# Patient Record
Sex: FEMALE | Race: WHITE | NOT HISPANIC OR LATINO | Employment: OTHER | URBAN - METROPOLITAN AREA
[De-identification: names, ages, dates, MRNs, and addresses within clinical notes are randomized per-mention and may not be internally consistent; named-entity substitution may affect disease eponyms.]

---

## 2023-11-03 ENCOUNTER — LAB (OUTPATIENT)
Dept: LAB | Facility: CLINIC | Age: 73
End: 2023-11-03
Payer: MEDICARE

## 2023-11-03 ENCOUNTER — HOSPITAL ENCOUNTER (OUTPATIENT)
Dept: RADIOLOGY | Facility: HOSPITAL | Age: 73
Discharge: HOME | End: 2023-11-03
Payer: MEDICARE

## 2023-11-03 DIAGNOSIS — D50.9 IRON DEFICIENCY ANEMIA, UNSPECIFIED: ICD-10-CM

## 2023-11-03 DIAGNOSIS — C50.512 MALIGNANT NEOPLASM OF LOWER-OUTER QUADRANT OF LEFT FEMALE BREAST (MULTI): ICD-10-CM

## 2023-11-03 DIAGNOSIS — T45.4X5A ADVERSE EFFECT OF IRON AND ITS COMPOUNDS, INITIAL ENCOUNTER: ICD-10-CM

## 2023-11-03 DIAGNOSIS — C34.31 MALIGNANT NEOPLASM OF LOWER LOBE, RIGHT BRONCHUS OR LUNG (MULTI): ICD-10-CM

## 2023-11-03 LAB
ALBUMIN SERPL BCP-MCNC: 4.4 G/DL (ref 3.4–5)
ALP SERPL-CCNC: 47 U/L (ref 33–136)
ALT SERPL W P-5'-P-CCNC: 17 U/L (ref 7–45)
ANION GAP SERPL CALC-SCNC: 14 MMOL/L (ref 10–20)
AST SERPL W P-5'-P-CCNC: 26 U/L (ref 9–39)
BASOPHILS # BLD AUTO: 0.03 X10*3/UL (ref 0–0.1)
BASOPHILS NFR BLD AUTO: 0.6 %
BILIRUB SERPL-MCNC: 0.4 MG/DL (ref 0–1.2)
BUN SERPL-MCNC: 15 MG/DL (ref 6–23)
CALCIUM SERPL-MCNC: 8.3 MG/DL (ref 8.6–10.3)
CHLORIDE SERPL-SCNC: 103 MMOL/L (ref 98–107)
CO2 SERPL-SCNC: 26 MMOL/L (ref 21–32)
CREAT SERPL-MCNC: 1.06 MG/DL (ref 0.5–1.05)
EOSINOPHIL # BLD AUTO: 0.1 X10*3/UL (ref 0–0.4)
EOSINOPHIL NFR BLD AUTO: 2 %
ERYTHROCYTE [DISTWIDTH] IN BLOOD BY AUTOMATED COUNT: 15.7 % (ref 11.5–14.5)
FERRITIN SERPL-MCNC: 86 NG/ML (ref 8–150)
GFR SERPL CREATININE-BSD FRML MDRD: 55 ML/MIN/1.73M*2
GLUCOSE SERPL-MCNC: 109 MG/DL (ref 74–99)
HCT VFR BLD AUTO: 33.5 % (ref 36–46)
HGB BLD-MCNC: 10.8 G/DL (ref 12–16)
IMM GRANULOCYTES # BLD AUTO: 0.04 X10*3/UL (ref 0–0.5)
IMM GRANULOCYTES NFR BLD AUTO: 0.8 % (ref 0–0.9)
IRON SATN MFR SERPL: 35 % (ref 25–45)
IRON SERPL-MCNC: 107 UG/DL (ref 35–150)
LYMPHOCYTES # BLD AUTO: 1.67 X10*3/UL (ref 0.8–3)
LYMPHOCYTES NFR BLD AUTO: 33.3 %
MCH RBC QN AUTO: 31.8 PG (ref 26–34)
MCHC RBC AUTO-ENTMCNC: 32.2 G/DL (ref 32–36)
MCV RBC AUTO: 99 FL (ref 80–100)
MONOCYTES # BLD AUTO: 0.5 X10*3/UL (ref 0.05–0.8)
MONOCYTES NFR BLD AUTO: 10 %
NEUTROPHILS # BLD AUTO: 2.68 X10*3/UL (ref 1.6–5.5)
NEUTROPHILS NFR BLD AUTO: 53.3 %
PLATELET # BLD AUTO: 212 X10*3/UL (ref 150–450)
POTASSIUM SERPL-SCNC: 3.9 MMOL/L (ref 3.5–5.3)
PROT SERPL-MCNC: 6.7 G/DL (ref 6.4–8.2)
RBC # BLD AUTO: 3.4 X10*6/UL (ref 4–5.2)
SODIUM SERPL-SCNC: 139 MMOL/L (ref 136–145)
TIBC SERPL-MCNC: 309 UG/DL (ref 240–445)
UIBC SERPL-MCNC: 202 UG/DL (ref 110–370)
WBC # BLD AUTO: 5 X10*3/UL (ref 4.4–11.3)

## 2023-11-03 PROCEDURE — 70470 CT HEAD/BRAIN W/O & W/DYE: CPT

## 2023-11-03 PROCEDURE — 85025 COMPLETE CBC W/AUTO DIFF WBC: CPT

## 2023-11-03 PROCEDURE — 82728 ASSAY OF FERRITIN: CPT

## 2023-11-03 PROCEDURE — 2550000001 HC RX 255 CONTRASTS: Performed by: INTERNAL MEDICINE

## 2023-11-03 PROCEDURE — 83540 ASSAY OF IRON: CPT

## 2023-11-03 PROCEDURE — 36415 COLL VENOUS BLD VENIPUNCTURE: CPT

## 2023-11-03 PROCEDURE — 70470 CT HEAD/BRAIN W/O & W/DYE: CPT | Performed by: RADIOLOGY

## 2023-11-03 PROCEDURE — 80053 COMPREHEN METABOLIC PANEL: CPT

## 2023-11-03 PROCEDURE — 83550 IRON BINDING TEST: CPT

## 2023-11-03 RX ADMIN — IOHEXOL 50 ML: 350 INJECTION, SOLUTION INTRAVENOUS at 09:03

## 2024-01-08 PROBLEM — M96.1 FAILED BACK SYNDROME OF CERVICAL SPINE: Status: ACTIVE | Noted: 2024-01-08

## 2024-01-08 PROBLEM — C34.90 LUNG CANCER (MULTI): Status: ACTIVE | Noted: 2024-01-08

## 2024-01-08 PROBLEM — I49.3 PREMATURE VENTRICULAR CONTRACTIONS: Status: ACTIVE | Noted: 2024-01-08

## 2024-01-08 PROBLEM — E66.3 OVERWEIGHT: Status: ACTIVE | Noted: 2024-01-08

## 2024-01-08 PROBLEM — L53.9 ERYTHEMA OF BREAST: Status: ACTIVE | Noted: 2024-01-08

## 2024-01-08 PROBLEM — M25.531 RIGHT WRIST PAIN: Status: ACTIVE | Noted: 2024-01-08

## 2024-01-08 PROBLEM — F17.200 NICOTINE DEPENDENCE: Status: ACTIVE | Noted: 2024-01-08

## 2024-01-08 PROBLEM — M50.20 OTHER CERVICAL DISC DISPLACEMENT, UNSPECIFIED CERVICAL REGION: Status: ACTIVE | Noted: 2024-01-08

## 2024-01-08 PROBLEM — M54.2 CERVICALGIA: Status: ACTIVE | Noted: 2024-01-08

## 2024-01-08 PROBLEM — R00.0 HEART RATE FAST: Status: ACTIVE | Noted: 2024-01-08

## 2024-01-08 PROBLEM — R91.1 LUNG NODULE: Status: ACTIVE | Noted: 2024-01-08

## 2024-01-08 PROBLEM — M47.812 SPONDYLOSIS OF CERVICAL REGION WITHOUT MYELOPATHY OR RADICULOPATHY: Status: ACTIVE | Noted: 2024-01-08

## 2024-01-08 PROBLEM — S63.501A SPRAIN OF RIGHT WRIST: Status: ACTIVE | Noted: 2024-01-08

## 2024-01-08 PROBLEM — E87.5 HYPERKALEMIA: Status: ACTIVE | Noted: 2024-01-08

## 2024-01-08 PROBLEM — S69.81XA INJURY OF TRIANGULAR FIBROCARTILAGE COMPLEX OF RIGHT WRIST: Status: ACTIVE | Noted: 2024-01-08

## 2024-01-08 PROBLEM — R06.02 SHORTNESS OF BREATH: Status: ACTIVE | Noted: 2024-01-08

## 2024-01-08 PROBLEM — C50.512 MALIGNANT NEOPLASM OF LOWER-OUTER QUADRANT OF LEFT FEMALE BREAST (MULTI): Status: ACTIVE | Noted: 2024-01-08

## 2024-01-08 PROBLEM — Z91.199 NONCOMPLIANCE WITH THERAPEUTIC PLAN: Status: ACTIVE | Noted: 2024-01-08

## 2024-01-08 RX ORDER — IPRATROPIUM BROMIDE AND ALBUTEROL 20; 100 UG/1; UG/1
2 SPRAY, METERED RESPIRATORY (INHALATION) EVERY 6 HOURS
COMMUNITY
Start: 2021-09-18

## 2024-01-08 RX ORDER — NITROGLYCERIN 0.4 MG/1
0.4 TABLET SUBLINGUAL EVERY 5 MIN PRN
COMMUNITY
Start: 2021-09-30

## 2024-01-08 RX ORDER — FLUTICASONE FUROATE, UMECLIDINIUM BROMIDE AND VILANTEROL TRIFENATATE 100; 62.5; 25 UG/1; UG/1; UG/1
1 POWDER RESPIRATORY (INHALATION) DAILY
COMMUNITY

## 2024-01-08 RX ORDER — AMLODIPINE BESYLATE 5 MG/1
5 TABLET ORAL DAILY
COMMUNITY
Start: 2011-03-10

## 2024-01-08 RX ORDER — OMEPRAZOLE 20 MG/1
20 CAPSULE, DELAYED RELEASE ORAL
COMMUNITY
Start: 2023-08-25

## 2024-01-08 RX ORDER — PRAVASTATIN SODIUM 80 MG/1
80 TABLET ORAL DAILY
COMMUNITY

## 2024-01-08 RX ORDER — CALCIUM CARBONATE 600 MG
1 TABLET ORAL 2 TIMES DAILY
COMMUNITY

## 2024-01-08 RX ORDER — ASPIRIN 81 MG/1
1 TABLET ORAL DAILY
COMMUNITY
End: 2024-01-29 | Stop reason: WASHOUT

## 2024-01-08 RX ORDER — HYDROCODONE BITARTRATE AND ACETAMINOPHEN 5; 325 MG/1; MG/1
1 TABLET ORAL EVERY 6 HOURS PRN
COMMUNITY

## 2024-01-08 RX ORDER — BACLOFEN 20 MG
1 TABLET ORAL 2 TIMES DAILY
COMMUNITY
End: 2024-01-29 | Stop reason: WASHOUT

## 2024-01-08 RX ORDER — GLIPIZIDE 2.5 MG/1
1 TABLET, EXTENDED RELEASE ORAL DAILY
COMMUNITY

## 2024-01-08 RX ORDER — METOPROLOL SUCCINATE 50 MG/1
2 TABLET, EXTENDED RELEASE ORAL 2 TIMES DAILY
COMMUNITY
End: 2024-01-29 | Stop reason: WASHOUT

## 2024-01-08 RX ORDER — METFORMIN HYDROCHLORIDE 500 MG/1
500 TABLET ORAL
COMMUNITY
Start: 2012-09-27

## 2024-01-08 RX ORDER — LOSARTAN POTASSIUM 100 MG/1
100 TABLET ORAL DAILY
COMMUNITY

## 2024-01-08 RX ORDER — CHOLECALCIFEROL (VITAMIN D3) 25 MCG
1000 TABLET ORAL DAILY
COMMUNITY

## 2024-01-08 RX ORDER — CYCLOBENZAPRINE HCL 10 MG
10 TABLET ORAL 3 TIMES DAILY PRN
COMMUNITY
Start: 2023-10-11

## 2024-01-08 RX ORDER — HYDROCHLOROTHIAZIDE 25 MG/1
25 TABLET ORAL
COMMUNITY

## 2024-01-08 RX ORDER — SPIRONOLACTONE 25 MG/1
25 TABLET ORAL DAILY
COMMUNITY

## 2024-01-08 RX ORDER — IPRATROPIUM BROMIDE 0.5 MG/2.5ML
0.5 SOLUTION RESPIRATORY (INHALATION) 4 TIMES DAILY
COMMUNITY
Start: 2023-07-12

## 2024-01-08 RX ORDER — ISOSORBIDE MONONITRATE 30 MG/1
1 TABLET, EXTENDED RELEASE ORAL DAILY
COMMUNITY

## 2024-01-29 ENCOUNTER — OFFICE VISIT (OUTPATIENT)
Dept: CARDIOLOGY | Facility: CLINIC | Age: 74
End: 2024-01-29
Payer: MEDICARE

## 2024-01-29 VITALS
HEART RATE: 74 BPM | TEMPERATURE: 97.5 F | SYSTOLIC BLOOD PRESSURE: 124 MMHG | HEIGHT: 61 IN | WEIGHT: 171 LBS | DIASTOLIC BLOOD PRESSURE: 75 MMHG | BODY MASS INDEX: 32.28 KG/M2

## 2024-01-29 DIAGNOSIS — E66.9 OBESITY (BMI 30.0-34.9): ICD-10-CM

## 2024-01-29 DIAGNOSIS — I25.10 ARTERIOSCLEROSIS OF CORONARY ARTERY: ICD-10-CM

## 2024-01-29 DIAGNOSIS — I10 HYPERTENSION, UNSPECIFIED TYPE: ICD-10-CM

## 2024-01-29 DIAGNOSIS — E78.5 HYPERLIPIDEMIA, UNSPECIFIED HYPERLIPIDEMIA TYPE: ICD-10-CM

## 2024-01-29 PROBLEM — E66.811 OBESITY (BMI 30.0-34.9): Status: ACTIVE | Noted: 2024-01-29

## 2024-01-29 PROCEDURE — 99214 OFFICE O/P EST MOD 30 MIN: CPT | Performed by: INTERNAL MEDICINE

## 2024-01-29 PROCEDURE — 1159F MED LIST DOCD IN RCRD: CPT | Performed by: INTERNAL MEDICINE

## 2024-01-29 PROCEDURE — 1036F TOBACCO NON-USER: CPT | Performed by: INTERNAL MEDICINE

## 2024-01-29 PROCEDURE — 3078F DIAST BP <80 MM HG: CPT | Performed by: INTERNAL MEDICINE

## 2024-01-29 PROCEDURE — 3074F SYST BP LT 130 MM HG: CPT | Performed by: INTERNAL MEDICINE

## 2024-01-29 RX ORDER — METOPROLOL TARTRATE 50 MG/1
50 TABLET ORAL 2 TIMES DAILY
COMMUNITY

## 2024-01-29 ASSESSMENT — PATIENT HEALTH QUESTIONNAIRE - PHQ9
1. LITTLE INTEREST OR PLEASURE IN DOING THINGS: NOT AT ALL
2. FEELING DOWN, DEPRESSED OR HOPELESS: NOT AT ALL
SUM OF ALL RESPONSES TO PHQ9 QUESTIONS 1 AND 2: 0

## 2024-01-29 ASSESSMENT — ENCOUNTER SYMPTOMS
CONSTITUTIONAL NEGATIVE: 1
NEUROLOGICAL NEGATIVE: 1
RESPIRATORY NEGATIVE: 1
CARDIOVASCULAR NEGATIVE: 1

## 2024-01-29 NOTE — PROGRESS NOTES
Referred by Dr. Asif ref. provider found provider found for   Chief Complaint   Patient presents with    Follow-up     Follow up.         History of Present Illness  Lisa Andrade is a 73 y.o. year old female patient with history of coronary artery disease.  Doing well from a cardiac standpoint no complaint no symptoms of chest pain or shortness of breath.  Denies any symptoms of palpitations syncope or presyncope.  I discussed with the patient in length we will continue medication schedule for blood work apparently by primary care physician results are pending.  Will call for any problem and follow-up as scheduled    Past Medical History  Past Medical History:   Diagnosis Date    Old myocardial infarction     History of myocardial infarction    Personal history of diseases of the blood and blood-forming organs and certain disorders involving the immune mechanism     History of anemia    Personal history of malignant neoplasm of breast     History of malignant neoplasm of female breast    Personal history of other diseases of the circulatory system     History of hypertension    Personal history of other diseases of the circulatory system     History of cardiac murmur    Personal history of other diseases of the digestive system     History of esophageal reflux    Personal history of other diseases of the digestive system     History of hiatal hernia    Personal history of other diseases of the musculoskeletal system and connective tissue     History of backache    Personal history of other diseases of the musculoskeletal system and connective tissue     History of rheumatoid arthritis    Personal history of other endocrine, nutritional and metabolic disease     History of diabetes mellitus    Personal history of other endocrine, nutritional and metabolic disease     History of hypokalemia    Pure hypercholesterolemia, unspecified     High cholesterol       Social History  Social History     Tobacco Use    Smoking  status: Former     Types: Cigarettes    Smokeless tobacco: Never   Substance Use Topics    Alcohol use: Not Currently    Drug use: Not on file       Family History     Family History   Problem Relation Name Age of Onset    Hypertension Mother      Coronary artery disease Mother      Migraines Mother      No Known Problems Father      Hodgkin's lymphoma Sister      Uterine cancer Sister         Review of Systems  As per HPI, all other systems reviewed and negative.    Allergies:  Allergies   Allergen Reactions    Fish Oil Unknown    Lipitor [Atorvastatin] Unknown    Wasp Venom Unknown    Zocor [Simvastatin] Unknown        Outpatient Medications:  Current Outpatient Medications   Medication Instructions    amLODIPine (NORVASC) 5 mg, oral, Daily    aspirin 81 mg EC tablet 1 tablet, oral, Daily    calcium carbonate 600 mg calcium (1,500 mg) tablet 1 tablet, oral, 2 times daily    cholecalciferol (VITAMIN D-3) 1,000 Units, oral, Daily    cyclobenzaprine (FLEXERIL) 10 mg, oral, 3 times daily PRN    fluticasone-umeclidin-vilanter (Trelegy Ellipta) 100-62.5-25 mcg blister with device 1 puff, inhalation, Daily    glipiZIDE XL (Glucotrol XL) 2.5 mg 24 hr tablet 1 tablet, oral, Daily    hydroCHLOROthiazide (HYDRODIURIL) 25 mg, oral, Daily RT    HYDROcodone-acetaminophen (Norco) 5-325 mg tablet 1 tablet, oral, Every 6 hours PRN    ipratropium (ATROVENT) 0.5 mg, inhalation, 4 times daily    ipratropium-albuteroL (Combivent Respimat)  mcg/actuation inhaler 2 puffs, inhalation, Every 6 hours    isosorbide mononitrate ER (Imdur) 30 mg 24 hr tablet 1 tablet, oral, Daily    losartan (COZAAR) 100 mg, oral, Daily    magnesium oxide 500 mg tablet 1 tablet, oral, 2 times daily    metFORMIN (GLUCOPHAGE) 500 mg, oral, 2 times daily with meals    metoprolol succinate XL (Toprol-XL) 50 mg 24 hr tablet 2 tablets, oral, 2 times daily    metoprolol tartrate (LOPRESSOR) 50 mg, oral, 2 times daily    nitroglycerin (NITROSTAT) 0.4 mg,  sublingual, Every 5 min PRN    omeprazole (PRILOSEC) 20 mg, oral, Daily before breakfast    pravastatin (PRAVACHOL) 80 mg, oral, Daily, Alternate 40 mg daily and 80 mg daily    spironolactone (ALDACTONE) 25 mg, oral, Daily         Vitals:  Vitals:    01/29/24 1438   BP: 124/75   Pulse: 74   Temp: 36.4 °C (97.5 °F)       Physical Exam:  Physical Exam  Constitutional:       Appearance: She is obese.   Neck:      Vascular: No carotid bruit.   Cardiovascular:      Rate and Rhythm: Normal rate and regular rhythm.      Pulses: Normal pulses.      Heart sounds: No murmur heard.  Pulmonary:      Effort: Pulmonary effort is normal.      Breath sounds: Normal breath sounds.   Skin:     General: Skin is warm and dry.   Neurological:      General: No focal deficit present.      Mental Status: She is alert and oriented to person, place, and time.        Review of Systems   Constitutional: Negative.    Respiratory: Negative.     Cardiovascular: Negative.    Neurological: Negative.          Assessment/Plan   Problem List Items Addressed This Visit             ICD-10-CM    Hyperlipidemia E78.5    Hypertension I10    Arteriosclerosis of coronary artery I25.10    Relevant Medications    metoprolol tartrate (Lopressor) 50 mg tablet    Obesity (BMI 30.0-34.9) E66.9       Scribe Attestation  By signing my name below, Kathy LOPEZ LPN  , Scribe   attest that this documentation has been prepared under the direction and in the presence of Charles Bellamy MD.     Charles Bellamy MD [facility]  Interventional Cardiology   of [facility]     Thank you for allowing me to participate in the care of this patient. Please do not hesitate to contact me with any further questions or concerns.

## 2024-02-19 ENCOUNTER — LAB (OUTPATIENT)
Dept: LAB | Facility: CLINIC | Age: 74
End: 2024-02-19
Payer: MEDICARE

## 2024-02-19 DIAGNOSIS — C34.90 MALIGNANT NEOPLASM OF LUNG, UNSPECIFIED LATERALITY, UNSPECIFIED PART OF LUNG (MULTI): ICD-10-CM

## 2024-02-19 DIAGNOSIS — C50.512 MALIGNANT NEOPLASM OF LOWER-OUTER QUADRANT OF LEFT FEMALE BREAST, UNSPECIFIED ESTROGEN RECEPTOR STATUS (MULTI): ICD-10-CM

## 2024-02-19 LAB
ALBUMIN SERPL BCP-MCNC: 4.7 G/DL (ref 3.4–5)
ALP SERPL-CCNC: 57 U/L (ref 33–136)
ALT SERPL W P-5'-P-CCNC: 19 U/L (ref 7–45)
ANION GAP SERPL CALC-SCNC: 16 MMOL/L (ref 10–20)
AST SERPL W P-5'-P-CCNC: 28 U/L (ref 9–39)
BASOPHILS # BLD AUTO: 0.03 X10*3/UL (ref 0–0.1)
BASOPHILS NFR BLD AUTO: 0.6 %
BILIRUB SERPL-MCNC: 0.4 MG/DL (ref 0–1.2)
BUN SERPL-MCNC: 21 MG/DL (ref 6–23)
CALCIUM SERPL-MCNC: 9.7 MG/DL (ref 8.6–10.3)
CANCER AG27-29 SERPL-ACNC: 34.7 U/ML (ref 0–38.6)
CEA SERPL-MCNC: 1.6 UG/L
CHLORIDE SERPL-SCNC: 100 MMOL/L (ref 98–107)
CO2 SERPL-SCNC: 26 MMOL/L (ref 21–32)
CREAT SERPL-MCNC: 1.09 MG/DL (ref 0.5–1.05)
EGFRCR SERPLBLD CKD-EPI 2021: 53 ML/MIN/1.73M*2
EOSINOPHIL # BLD AUTO: 0.11 X10*3/UL (ref 0–0.4)
EOSINOPHIL NFR BLD AUTO: 2.2 %
ERYTHROCYTE [DISTWIDTH] IN BLOOD BY AUTOMATED COUNT: 13.1 % (ref 11.5–14.5)
FERRITIN SERPL-MCNC: 47 NG/ML (ref 8–150)
GLUCOSE SERPL-MCNC: 99 MG/DL (ref 74–99)
HCT VFR BLD AUTO: 35.6 % (ref 36–46)
HGB BLD-MCNC: 11.2 G/DL (ref 12–16)
IMM GRANULOCYTES # BLD AUTO: 0.02 X10*3/UL (ref 0–0.5)
IMM GRANULOCYTES NFR BLD AUTO: 0.4 % (ref 0–0.9)
IRON SATN MFR SERPL: 36 % (ref 25–45)
IRON SERPL-MCNC: 141 UG/DL (ref 35–150)
LYMPHOCYTES # BLD AUTO: 1.6 X10*3/UL (ref 0.8–3)
LYMPHOCYTES NFR BLD AUTO: 31.4 %
MCH RBC QN AUTO: 32.3 PG (ref 26–34)
MCHC RBC AUTO-ENTMCNC: 31.5 G/DL (ref 32–36)
MCV RBC AUTO: 103 FL (ref 80–100)
MONOCYTES # BLD AUTO: 0.53 X10*3/UL (ref 0.05–0.8)
MONOCYTES NFR BLD AUTO: 10.4 %
NEUTROPHILS # BLD AUTO: 2.8 X10*3/UL (ref 1.6–5.5)
NEUTROPHILS NFR BLD AUTO: 55 %
PLATELET # BLD AUTO: 236 X10*3/UL (ref 150–450)
POTASSIUM SERPL-SCNC: 5.1 MMOL/L (ref 3.5–5.3)
PROT SERPL-MCNC: 7.3 G/DL (ref 6.4–8.2)
RBC # BLD AUTO: 3.47 X10*6/UL (ref 4–5.2)
SODIUM SERPL-SCNC: 137 MMOL/L (ref 136–145)
TIBC SERPL-MCNC: 393 UG/DL (ref 240–445)
UIBC SERPL-MCNC: 252 UG/DL (ref 110–370)
WBC # BLD AUTO: 5.1 X10*3/UL (ref 4.4–11.3)

## 2024-02-19 PROCEDURE — 83540 ASSAY OF IRON: CPT

## 2024-02-19 PROCEDURE — 82378 CARCINOEMBRYONIC ANTIGEN: CPT

## 2024-02-19 PROCEDURE — 36415 COLL VENOUS BLD VENIPUNCTURE: CPT

## 2024-02-19 PROCEDURE — 84075 ASSAY ALKALINE PHOSPHATASE: CPT

## 2024-02-19 PROCEDURE — 86300 IMMUNOASSAY TUMOR CA 15-3: CPT

## 2024-02-19 PROCEDURE — 82728 ASSAY OF FERRITIN: CPT

## 2024-02-19 PROCEDURE — 85025 COMPLETE CBC W/AUTO DIFF WBC: CPT

## 2024-02-20 ENCOUNTER — HOSPITAL ENCOUNTER (OUTPATIENT)
Dept: RADIOLOGY | Facility: HOSPITAL | Age: 74
Discharge: HOME | End: 2024-02-20
Payer: MEDICARE

## 2024-02-20 DIAGNOSIS — C50.512 MALIGNANT NEOPLASM OF LOWER-OUTER QUADRANT OF LEFT FEMALE BREAST (MULTI): ICD-10-CM

## 2024-02-20 DIAGNOSIS — C34.31 MALIGNANT NEOPLASM OF LOWER LOBE, RIGHT BRONCHUS OR LUNG (MULTI): ICD-10-CM

## 2024-02-20 PROCEDURE — 2550000001 HC RX 255 CONTRASTS: Performed by: INTERNAL MEDICINE

## 2024-02-20 PROCEDURE — 71260 CT THORAX DX C+: CPT

## 2024-02-20 RX ADMIN — IOHEXOL 50 ML: 350 INJECTION, SOLUTION INTRAVENOUS at 09:23

## 2024-02-23 ENCOUNTER — APPOINTMENT (OUTPATIENT)
Dept: HEMATOLOGY/ONCOLOGY | Facility: CLINIC | Age: 74
End: 2024-02-23
Payer: MEDICARE

## 2024-03-08 ENCOUNTER — OFFICE VISIT (OUTPATIENT)
Dept: HEMATOLOGY/ONCOLOGY | Facility: CLINIC | Age: 74
End: 2024-03-08
Payer: MEDICARE

## 2024-03-08 VITALS
WEIGHT: 172.18 LBS | BODY MASS INDEX: 32.53 KG/M2 | DIASTOLIC BLOOD PRESSURE: 74 MMHG | TEMPERATURE: 96.8 F | RESPIRATION RATE: 16 BRPM | OXYGEN SATURATION: 96 % | SYSTOLIC BLOOD PRESSURE: 137 MMHG | HEART RATE: 66 BPM

## 2024-03-08 DIAGNOSIS — Z17.0 MALIGNANT NEOPLASM OF CENTRAL PORTION OF RIGHT BREAST IN FEMALE, ESTROGEN RECEPTOR POSITIVE (MULTI): ICD-10-CM

## 2024-03-08 DIAGNOSIS — C34.11 MALIGNANT NEOPLASM OF UPPER LOBE OF RIGHT LUNG (MULTI): ICD-10-CM

## 2024-03-08 DIAGNOSIS — C50.111 MALIGNANT NEOPLASM OF CENTRAL PORTION OF RIGHT BREAST IN FEMALE, ESTROGEN RECEPTOR POSITIVE (MULTI): ICD-10-CM

## 2024-03-08 DIAGNOSIS — E11.9 TYPE 2 DIABETES MELLITUS WITHOUT COMPLICATION, WITHOUT LONG-TERM CURRENT USE OF INSULIN (MULTI): ICD-10-CM

## 2024-03-08 DIAGNOSIS — C50.512 MALIGNANT NEOPLASM OF LOWER-OUTER QUADRANT OF LEFT BREAST OF FEMALE, ESTROGEN RECEPTOR POSITIVE (MULTI): Primary | ICD-10-CM

## 2024-03-08 DIAGNOSIS — I25.10 ARTERIOSCLEROSIS OF CORONARY ARTERY: ICD-10-CM

## 2024-03-08 DIAGNOSIS — M19.90 OSTEOARTHRITIS, UNSPECIFIED OSTEOARTHRITIS TYPE, UNSPECIFIED SITE: ICD-10-CM

## 2024-03-08 DIAGNOSIS — J44.9 CHRONIC OBSTRUCTIVE PULMONARY DISEASE, UNSPECIFIED COPD TYPE (MULTI): ICD-10-CM

## 2024-03-08 DIAGNOSIS — Z17.0 MALIGNANT NEOPLASM OF LOWER-OUTER QUADRANT OF LEFT BREAST OF FEMALE, ESTROGEN RECEPTOR POSITIVE (MULTI): Primary | ICD-10-CM

## 2024-03-08 DIAGNOSIS — I10 HYPERTENSION, UNSPECIFIED TYPE: ICD-10-CM

## 2024-03-08 DIAGNOSIS — K21.00 GASTROESOPHAGEAL REFLUX DISEASE WITH ESOPHAGITIS WITHOUT HEMORRHAGE: ICD-10-CM

## 2024-03-08 PROCEDURE — 99214 OFFICE O/P EST MOD 30 MIN: CPT | Performed by: INTERNAL MEDICINE

## 2024-03-08 PROCEDURE — 1126F AMNT PAIN NOTED NONE PRSNT: CPT | Performed by: INTERNAL MEDICINE

## 2024-03-08 PROCEDURE — 4010F ACE/ARB THERAPY RXD/TAKEN: CPT | Performed by: INTERNAL MEDICINE

## 2024-03-08 PROCEDURE — 3075F SYST BP GE 130 - 139MM HG: CPT | Performed by: INTERNAL MEDICINE

## 2024-03-08 PROCEDURE — 1036F TOBACCO NON-USER: CPT | Performed by: INTERNAL MEDICINE

## 2024-03-08 PROCEDURE — 1159F MED LIST DOCD IN RCRD: CPT | Performed by: INTERNAL MEDICINE

## 2024-03-08 PROCEDURE — 3078F DIAST BP <80 MM HG: CPT | Performed by: INTERNAL MEDICINE

## 2024-03-08 ASSESSMENT — PAIN SCALES - GENERAL: PAINLEVEL: 0-NO PAIN

## 2024-03-08 NOTE — PROGRESS NOTES
Patient ID: Lisa Andrade is a 73 y.o. female.  Referring Physician: No referring provider defined for this encounter.  Primary Care Provider: Quentin Jaffe MD  Visit Type: Follow Up      Subjective    HPI How was my CT scan?    Review of Systems   Constitutional: Negative.    HENT:  Negative.     Eyes: Negative.    Respiratory: Negative.     Cardiovascular: Negative.    Gastrointestinal: Negative.    Endocrine: Negative.    Genitourinary: Negative.     Musculoskeletal: Negative.    Skin: Negative.    Neurological: Negative.    Hematological: Negative.    Psychiatric/Behavioral: Negative.          Objective   BSA: 1.83 meters squared  /74   Pulse 66   Temp 36 °C (96.8 °F)   Resp 16   Wt 78.1 kg (172 lb 2.9 oz)   SpO2 96%   BMI 32.53 kg/m²      has a past medical history of Old myocardial infarction, Personal history of diseases of the blood and blood-forming organs and certain disorders involving the immune mechanism, Personal history of malignant neoplasm of breast, Personal history of other diseases of the circulatory system, Personal history of other diseases of the circulatory system, Personal history of other diseases of the digestive system, Personal history of other diseases of the digestive system, Personal history of other diseases of the musculoskeletal system and connective tissue, Personal history of other diseases of the musculoskeletal system and connective tissue, Personal history of other endocrine, nutritional and metabolic disease, Personal history of other endocrine, nutritional and metabolic disease, and Pure hypercholesterolemia, unspecified.   has a past surgical history that includes Other surgical history (08/23/2013); Hysterectomy (08/23/2013); Coronary angioplasty with stent (06/20/2016); Colonoscopy (06/20/2016); Other surgical history (11/20/2021); Other surgical history (11/20/2021); Breast lumpectomy (03/28/2016); Other surgical history (03/28/2016); Other surgical  history (03/28/2016); Appendectomy (03/28/2016); Cholecystectomy (03/28/2016); Cervical fusion (04/19/2016); and MR angio head wo IV contrast (8/18/2023).  Family History   Problem Relation Name Age of Onset    Hypertension Mother      Coronary artery disease Mother      Migraines Mother      No Known Problems Father      Hodgkin's lymphoma Sister      Uterine cancer Sister       Oncology History    No history exists.       Lisa Andrade  reports that she has quit smoking. Her smoking use included cigarettes. She has never used smokeless tobacco.  She  reports that she does not currently use alcohol.  She  has no history on file for drug use.    Physical Exam  Vitals reviewed.   Constitutional:       Appearance: Normal appearance.   HENT:      Head: Normocephalic.      Mouth/Throat:      Mouth: Mucous membranes are moist.   Eyes:      Extraocular Movements: Extraocular movements intact.      Pupils: Pupils are equal, round, and reactive to light.   Cardiovascular:      Rate and Rhythm: Normal rate and regular rhythm.      Heart sounds: Normal heart sounds.   Pulmonary:      Breath sounds: Normal breath sounds.   Abdominal:      General: Bowel sounds are normal.      Palpations: Abdomen is soft.   Musculoskeletal:         General: Normal range of motion.      Cervical back: Normal range of motion and neck supple.   Skin:     General: Skin is warm.   Neurological:      General: No focal deficit present.      Mental Status: She is alert and oriented to person, place, and time.   Psychiatric:         Mood and Affect: Mood normal.         Behavior: Behavior normal.         WBC   Date/Time Value Ref Range Status   02/19/2024 12:30 PM 5.1 4.4 - 11.3 x10*3/uL Final   11/03/2023 07:51 AM 5.0 4.4 - 11.3 x10*3/uL Final   08/21/2023 03:06 PM 5.8 4.4 - 11.3 x10E9/L Final   08/20/2023 07:48 AM 5.2 4.4 - 11.3 x10E9/L Final   08/19/2023 07:38 AM 5.8 4.4 - 11.3 x10E9/L Final     nRBC   Date Value Ref Range Status   08/20/2023 0.0  0.0 - 0.0 /100 WBC Final   08/19/2023 0.0 0.0 - 0.0 /100 WBC Final   08/18/2023 0.0 0.0 - 0.0 /100 WBC Final     RBC   Date Value Ref Range Status   02/19/2024 3.47 (L) 4.00 - 5.20 x10*6/uL Final   11/03/2023 3.40 (L) 4.00 - 5.20 x10*6/uL Final   08/21/2023 3.19 (L) 4.00 - 5.20 x10E12/L Final   08/20/2023 2.66 (L) 4.00 - 5.20 x10E12/L Final   08/19/2023 2.62 (L) 4.00 - 5.20 x10E12/L Final     Hemoglobin   Date Value Ref Range Status   02/19/2024 11.2 (L) 12.0 - 16.0 g/dL Final   11/03/2023 10.8 (L) 12.0 - 16.0 g/dL Final   08/21/2023 9.4 (L) 12.0 - 16.0 g/dL Final   08/20/2023 7.5 (L) 12.0 - 16.0 g/dL Final   08/19/2023 7.3 (L) 12.0 - 16.0 g/dL Final     Hematocrit   Date Value Ref Range Status   02/19/2024 35.6 (L) 36.0 - 46.0 % Final   11/03/2023 33.5 (L) 36.0 - 46.0 % Final   08/21/2023 29.2 (L) 36.0 - 46.0 % Final   08/20/2023 24.4 (L) 36.0 - 46.0 % Final   08/19/2023 24.0 (L) 36.0 - 46.0 % Final     MCV   Date/Time Value Ref Range Status   02/19/2024 12:30  (H) 80 - 100 fL Final   11/03/2023 07:51 AM 99 80 - 100 fL Final   08/21/2023 03:06 PM 92 80 - 100 fL Final   08/20/2023 07:48 AM 92 80 - 100 fL Final   08/19/2023 07:38 AM 92 80 - 100 fL Final     MCH   Date/Time Value Ref Range Status   02/19/2024 12:30 PM 32.3 26.0 - 34.0 pg Final   11/03/2023 07:51 AM 31.8 26.0 - 34.0 pg Final     MCHC   Date/Time Value Ref Range Status   02/19/2024 12:30 PM 31.5 (L) 32.0 - 36.0 g/dL Final   11/03/2023 07:51 AM 32.2 32.0 - 36.0 g/dL Final   08/21/2023 03:06 PM 32.2 32.0 - 36.0 g/dL Final   08/20/2023 07:48 AM 30.7 (L) 32.0 - 36.0 g/dL Final   08/19/2023 07:38 AM 30.4 (L) 32.0 - 36.0 g/dL Final     RDW   Date/Time Value Ref Range Status   02/19/2024 12:30 PM 13.1 11.5 - 14.5 % Final   11/03/2023 07:51 AM 15.7 (H) 11.5 - 14.5 % Final   08/21/2023 03:06 PM 16.5 (H) 11.5 - 14.5 % Final   08/20/2023 07:48 AM 16.1 (H) 11.5 - 14.5 % Final   08/19/2023 07:38 AM 15.8 (H) 11.5 - 14.5 % Final     Platelets   Date/Time Value  "Ref Range Status   02/19/2024 12:30  150 - 450 x10*3/uL Final   11/03/2023 07:51  150 - 450 x10*3/uL Final   08/21/2023 03:06  150 - 450 x10E9/L Final   08/20/2023 07:48  150 - 450 x10E9/L Final   08/19/2023 07:38  150 - 450 x10E9/L Final     No results found for: \"MPV\"  Neutrophils %   Date/Time Value Ref Range Status   02/19/2024 12:30 PM 55.0 40.0 - 80.0 % Final   11/03/2023 07:51 AM 53.3 40.0 - 80.0 % Final   08/21/2023 03:06 PM 64.8 40.0 - 80.0 % Final   07/28/2023 09:11 AM 54.6 40.0 - 80.0 % Final   06/05/2023 05:07 AM 57.5 40.0 - 80.0 % Final     Immature Granulocytes %, Automated   Date/Time Value Ref Range Status   02/19/2024 12:30 PM 0.4 0.0 - 0.9 % Final     Comment:     Immature Granulocyte Count (IG) includes promyelocytes, myelocytes and metamyelocytes but does not include bands. Percent differential counts (%) should be interpreted in the context of the absolute cell counts (cells/UL).   11/03/2023 07:51 AM 0.8 0.0 - 0.9 % Final     Comment:     Immature Granulocyte Count (IG) includes promyelocytes, myelocytes and metamyelocytes but does not include bands. Percent differential counts (%) should be interpreted in the context of the absolute cell counts (cells/UL).   08/21/2023 03:06 PM 0.7 0.0 - 0.9 % Final     Comment:      Immature Granulocyte Count (IG) includes promyelocytes,    myelocytes and metamyelocytes but does not include bands.   Percent differential counts (%) should be interpreted in the   context of the absolute cell counts (cells/L).     07/28/2023 09:11 AM 0.4 0.0 - 0.9 % Final     Comment:      Immature Granulocyte Count (IG) includes promyelocytes,    myelocytes and metamyelocytes but does not include bands.   Percent differential counts (%) should be interpreted in the   context of the absolute cell counts (cells/L).     06/05/2023 05:07 AM 0.5 0.0 - 0.9 % Final     Comment:      Immature Granulocyte Count (IG) includes promyelocytes,    myelocytes and " metamyelocytes but does not include bands.   Percent differential counts (%) should be interpreted in the   context of the absolute cell counts (cells/L).       Lymphocytes %   Date/Time Value Ref Range Status   02/19/2024 12:30 PM 31.4 13.0 - 44.0 % Final   11/03/2023 07:51 AM 33.3 13.0 - 44.0 % Final   08/21/2023 03:06 PM 21.9 13.0 - 44.0 % Final   07/28/2023 09:11 AM 32.3 13.0 - 44.0 % Final   06/05/2023 05:07 AM 28.3 13.0 - 44.0 % Final   02/08/2022 12:52 PM 15.0 13.0 - 44.0 % Final   02/07/2022 12:20 AM 10.0 13.0 - 44.0 % Final   02/04/2022 10:24 AM 10.0 13.0 - 44.0 % Final     Monocytes %   Date/Time Value Ref Range Status   02/19/2024 12:30 PM 10.4 2.0 - 10.0 % Final   11/03/2023 07:51 AM 10.0 2.0 - 10.0 % Final   08/21/2023 03:06 PM 12.0 2.0 - 10.0 % Final   07/28/2023 09:11 AM 10.4 2.0 - 10.0 % Final   06/05/2023 05:07 AM 11.3 2.0 - 10.0 % Final   02/08/2022 12:52 PM 14.0 2.0 - 10.0 % Final   02/07/2022 12:20 AM 5.0 2.0 - 10.0 % Final   02/04/2022 10:24 AM 6.0 2.0 - 10.0 % Final     Eosinophils %   Date/Time Value Ref Range Status   02/19/2024 12:30 PM 2.2 0.0 - 6.0 % Final   11/03/2023 07:51 AM 2.0 0.0 - 6.0 % Final   08/21/2023 03:06 PM 0.3 0.0 - 6.0 % Final   07/28/2023 09:11 AM 1.7 0.0 - 6.0 % Final   06/05/2023 05:07 AM 1.5 0.0 - 6.0 % Final   02/08/2022 12:52 PM 0.0 0.0 - 6.0 % Final   02/07/2022 12:20 AM 0.0 0.0 - 6.0 % Final   02/04/2022 10:24 AM 0.0 0.0 - 6.0 % Final     Basophils %   Date/Time Value Ref Range Status   02/19/2024 12:30 PM 0.6 0.0 - 2.0 % Final   11/03/2023 07:51 AM 0.6 0.0 - 2.0 % Final   08/21/2023 03:06 PM 0.3 0.0 - 2.0 % Final   07/28/2023 09:11 AM 0.6 0.0 - 2.0 % Final   06/05/2023 05:07 AM 0.9 0.0 - 2.0 % Final   02/08/2022 12:52 PM 0.0 % Final   02/07/2022 12:20 AM 0.0 % Final   02/04/2022 10:24 AM 0.0 % Final     Neutrophils Absolute   Date/Time Value Ref Range Status   02/19/2024 12:30 PM 2.80 1.60 - 5.50 x10*3/uL Final     Comment:     Percent differential counts (%)  should be interpreted in the context of the absolute cell counts (cells/uL).   11/03/2023 07:51 AM 2.68 1.60 - 5.50 x10*3/uL Final     Comment:     Percent differential counts (%) should be interpreted in the context of the absolute cell counts (cells/uL).   08/21/2023 03:06 PM 3.78 1.60 - 5.50 x10E9/L Final   07/28/2023 09:11 AM 2.88 1.60 - 5.50 x10E9/L Final   06/05/2023 05:07 AM 3.17 1.60 - 5.50 x10E9/L Final     Immature Granulocytes Absolute, Automated   Date/Time Value Ref Range Status   02/19/2024 12:30 PM 0.02 0.00 - 0.50 x10*3/uL Final   11/03/2023 07:51 AM 0.04 0.00 - 0.50 x10*3/uL Final     Lymphocytes Absolute   Date/Time Value Ref Range Status   02/19/2024 12:30 PM 1.60 0.80 - 3.00 x10*3/uL Final   11/03/2023 07:51 AM 1.67 0.80 - 3.00 x10*3/uL Final   08/21/2023 03:06 PM 1.28 0.80 - 3.00 x10E9/L Final   07/28/2023 09:11 AM 1.70 0.80 - 3.00 x10E9/L Final   06/05/2023 05:07 AM 1.56 0.80 - 3.00 x10E9/L Final     Monocytes Absolute   Date/Time Value Ref Range Status   02/19/2024 12:30 PM 0.53 0.05 - 0.80 x10*3/uL Final   11/03/2023 07:51 AM 0.50 0.05 - 0.80 x10*3/uL Final   08/21/2023 03:06 PM 0.70 0.05 - 0.80 x10E9/L Final   07/28/2023 09:11 AM 0.55 0.05 - 0.80 x10E9/L Final   06/05/2023 05:07 AM 0.62 0.05 - 0.80 x10E9/L Final     Eosinophils Absolute   Date/Time Value Ref Range Status   02/19/2024 12:30 PM 0.11 0.00 - 0.40 x10*3/uL Final   11/03/2023 07:51 AM 0.10 0.00 - 0.40 x10*3/uL Final   08/21/2023 03:06 PM 0.02 0.00 - 0.40 x10E9/L Final   07/28/2023 09:11 AM 0.09 0.00 - 0.40 x10E9/L Final   06/05/2023 05:07 AM 0.08 0.00 - 0.40 x10E9/L Final   02/08/2022 12:52 PM 0.00 0.00 - 0.40 x10E9/L Final   02/07/2022 12:20 AM 0.00 0.00 - 0.40 x10E9/L Final   02/04/2022 10:24 AM 0.00 0.00 - 0.40 x10E9/L Final     Basophils Absolute   Date/Time Value Ref Range Status   02/19/2024 12:30 PM 0.03 0.00 - 0.10 x10*3/uL Final   11/03/2023 07:51 AM 0.03 0.00 - 0.10 x10*3/uL Final   08/21/2023 03:06 PM 0.02 0.00 - 0.10  "x10E9/L Final   07/28/2023 09:11 AM 0.03 0.00 - 0.10 x10E9/L Final   06/05/2023 05:07 AM 0.05 0.00 - 0.10 x10E9/L Final   02/08/2022 12:52 PM 0.00 0.00 - 0.10 x10E9/L Final   02/07/2022 12:20 AM 0.00 0.00 - 0.10 x10E9/L Final   02/04/2022 10:24 AM 0.00 0.00 - 0.10 x10E9/L Final       No components found for: \"PT\"  aPTT   Date/Time Value Ref Range Status   09/07/2021 06:18 AM 27 25 - 35 sec Final     Comment:       THE APTT IS NO LONGER USED FOR MONITORING     UNFRACTIONATED HEPARIN THERAPY.    FOR MONITORING HEPARIN THERAPY,     USE THE HEPARIN ASSAY.       Medication Documentation Review Audit       Reviewed by Dori Carrizales MA (Medical Assistant) on 03/08/24 at 0856      Medication Order Taking? Sig Documenting Provider Last Dose Status   amLODIPine (Norvasc) 5 mg tablet [id number] Yes Take 1 tablet (5 mg) by mouth once daily. Historical Provider, MD Taking Active   calcium carbonate 600 mg calcium (1,500 mg) tablet [id number] Yes Take 1 tablet (1,500 mg) by mouth 2 times a day. Historical Provider, MD Taking Active   cholecalciferol (Vitamin D-3) 25 MCG (1000 UT) tablet [id number] Yes Take 1 tablet (1,000 Units) by mouth once daily. Historical Provider, MD Taking Active   cyclobenzaprine (Flexeril) 10 mg tablet [id number] Yes Take 1 tablet (10 mg) by mouth 3 times a day as needed. Historical Provider, MD Taking Active   fluticasone-umeclidin-vilanter (Trelegy Ellipta) 100-62.5-25 mcg blister with device [id number] Yes Inhale 1 puff once daily. Historical Provider, MD Taking Active   glipiZIDE XL (Glucotrol XL) 2.5 mg 24 hr tablet [id number] Yes Take 1 tablet (2.5 mg) by mouth once daily. Historical Provider, MD Taking Active   hydroCHLOROthiazide (HYDRODiuril) 25 mg tablet [id number] Yes Take 1 tablet (25 mg) by mouth once daily. Historical Provider, MD Taking Active   HYDROcodone-acetaminophen (Norco) 5-325 mg tablet [id number] Yes Take 1 tablet by mouth every 6 hours if needed. Historical Provider, MD Taking " Active   ipratropium (Atrovent) 0.02 % nebulizer solution [id number] Yes Inhale 2.5 mL (0.5 mg) 4 times a day. Historical MD Philip Taking Active   ipratropium-albuteroL (Combivent Respimat)  mcg/actuation inhaler [id number] Yes Inhale 2 puffs every 6 hours. Historical MD Philip Taking Active   isosorbide mononitrate ER (Imdur) 30 mg 24 hr tablet [id number] Yes Take 1 tablet (30 mg) by mouth once daily. Historical MD Philip Taking Active   losartan (Cozaar) 100 mg tablet [id number] Yes Take 1 tablet (100 mg) by mouth once daily. Historical MD Philip Taking Active   metFORMIN (Glucophage) 500 mg tablet [id number] Yes Take 1 tablet (500 mg) by mouth 2 times a day with meals. Historical MD Philip Taking Active   metoprolol tartrate (Lopressor) 50 mg tablet [id number] Yes Take 1 tablet by mouth twice a day. Historical MD Philip Taking Active   nitroglycerin (Nitrostat) 0.4 mg SL tablet [id number] Yes Place 1 tablet (0.4 mg) under the tongue every 5 minutes if needed for chest pain. Historical MD Philip Taking Active   omeprazole (PriLOSEC) 20 mg DR capsule [id number] Yes Take 1 capsule (20 mg) by mouth once daily in the morning. Take before meals. Historical MD Philip Taking Active   pravastatin (Pravachol) 80 mg tablet [id number] Yes Take 1 tablet (80 mg) by mouth once daily. Alternate 40 mg daily and 80 mg daily Historical MD Philip Taking Active   spironolactone (Aldactone) 25 mg tablet [id number] Yes Take 1 tablet (25 mg) by mouth once daily. Historical Provider, MD Taking Active                   Assessment/Plan    1) small cell lung cancer  -limited stage, was treated with chemoradiation  -completed 4 cycles of carboplatin + etoposide by 1/2022  -here for interval followup  -labs done on 2/19/2024 included CBC, COMP, iron panel + ferritin, CEA  -results reviewed--wbc 5.1, hgb 11.2, plt 236,000, creatinine 1.09, calcium 9.7, AST 28, ALT 19, CEA 1.6, TIBC 393, sat 36%, ferritin 47  -CT chest  done on 2/20/2024 reviewed- no evidence of thoracic lymphadenopathy; reticular and bandlike opacities are again seen in the medial aspect of the right upper lobe; 4.3 mm ground glass nodule in the right upper lobe; 4.7 mm ground glass nodule is seen in the right lower lobe;  4.5 mm unchanged subpleural nodule in RLL; 4 mm nodule in LLL unchanged; 2-3 mm nodule in right lobe unchanged; redemonstration of post treatment changes in the left breast  -as she has history of small cell lung cancer, which is associated with high risk for recurrence, will continue to monitor her closely over time  -next chest CT due in 6 months        2) breast cancer  -h/o stage IA ER+ IA+ [id number] neg breast cancer, T1bN0, intermediate Oncotype Dx score  -completed TC x 4  -s/p XRT  -currently on arimidex  -Ca 27.9 done on 2/19/2024 was 34.7     3) hypertension  -on amlodipine  -on losartan  -on metoprolol  -on HCTZ     4) CAD  -s/p cardiac cath with stent in 2007  -on ASA     5) diabetes  -on glipizide  -on metformin     6) arthritis  -on celebrex  -on plaquenil     7) GERD  -on omeprazole     8) COPD  -on combivent     9) hyperlipidemia  -on pravastatin        Problem List Items Addressed This Visit             ICD-10-CM    Lung cancer (CMS/HCC) C34.90    Relevant Orders    Cancer Antigen 27-29    CT chest w IV contrast    Malignant neoplasm of lower-outer quadrant of left female breast (CMS/HCC) - Primary C50.512    Relevant Orders    Clinic Appointment Request Follow Up; SILVANO CHAUDHARY; [facility]    CBC and Auto Differential    Comprehensive metabolic panel    CEA    Iron and TIBC    Ferritin     Other Visit Diagnoses         Codes    Malignant neoplasm of central portion of right breast in female, estrogen receptor positive (CMS/HCC)     C50.111, Z17.0    Relevant Orders    Cancer Antigen 27-29                 Silvano Chaudhary MD

## 2024-03-16 PROBLEM — E11.9 TYPE 2 DIABETES MELLITUS WITHOUT COMPLICATION, WITHOUT LONG-TERM CURRENT USE OF INSULIN (MULTI): Status: ACTIVE | Noted: 2024-03-16

## 2024-03-16 PROBLEM — J44.9 CHRONIC OBSTRUCTIVE PULMONARY DISEASE (MULTI): Status: ACTIVE | Noted: 2024-03-16

## 2024-03-16 PROBLEM — Z17.0 MALIGNANT NEOPLASM OF CENTRAL PORTION OF RIGHT BREAST IN FEMALE, ESTROGEN RECEPTOR POSITIVE (MULTI): Status: ACTIVE | Noted: 2024-03-16

## 2024-03-16 PROBLEM — K21.00 GASTROESOPHAGEAL REFLUX DISEASE WITH ESOPHAGITIS WITHOUT HEMORRHAGE: Status: ACTIVE | Noted: 2024-03-16

## 2024-03-16 PROBLEM — M19.90 OSTEOARTHRITIS: Status: ACTIVE | Noted: 2024-03-16

## 2024-03-16 PROBLEM — C50.111 MALIGNANT NEOPLASM OF CENTRAL PORTION OF RIGHT BREAST IN FEMALE, ESTROGEN RECEPTOR POSITIVE (MULTI): Status: ACTIVE | Noted: 2024-03-16

## 2024-03-16 ASSESSMENT — ENCOUNTER SYMPTOMS
PSYCHIATRIC NEGATIVE: 1
EYES NEGATIVE: 1
HEMATOLOGIC/LYMPHATIC NEGATIVE: 1
ENDOCRINE NEGATIVE: 1
RESPIRATORY NEGATIVE: 1
GASTROINTESTINAL NEGATIVE: 1
MUSCULOSKELETAL NEGATIVE: 1
NEUROLOGICAL NEGATIVE: 1
CONSTITUTIONAL NEGATIVE: 1
CARDIOVASCULAR NEGATIVE: 1

## 2024-04-03 ENCOUNTER — HOSPITAL ENCOUNTER (OUTPATIENT)
Dept: RADIOLOGY | Facility: CLINIC | Age: 74
Discharge: HOME | End: 2024-04-03
Payer: MEDICARE

## 2024-04-03 DIAGNOSIS — C50.919 BREAST CANCER (MULTI): ICD-10-CM

## 2024-04-03 DIAGNOSIS — Z85.3 HISTORY OF BREAST CANCER: ICD-10-CM

## 2024-04-03 DIAGNOSIS — Z12.31 ENCOUNTER FOR SCREENING MAMMOGRAM FOR MALIGNANT NEOPLASM OF BREAST: ICD-10-CM

## 2024-04-03 PROCEDURE — 77062 BREAST TOMOSYNTHESIS BI: CPT

## 2024-04-03 PROCEDURE — 77066 DX MAMMO INCL CAD BI: CPT | Performed by: RADIOLOGY

## 2024-04-03 PROCEDURE — G0279 TOMOSYNTHESIS, MAMMO: HCPCS | Performed by: RADIOLOGY

## 2024-09-06 ENCOUNTER — LAB (OUTPATIENT)
Dept: LAB | Facility: CLINIC | Age: 74
End: 2024-09-06
Payer: MEDICARE

## 2024-09-06 ENCOUNTER — HOSPITAL ENCOUNTER (OUTPATIENT)
Dept: RADIOLOGY | Facility: HOSPITAL | Age: 74
Discharge: HOME | End: 2024-09-06
Payer: MEDICARE

## 2024-09-06 DIAGNOSIS — C34.11 MALIGNANT NEOPLASM OF UPPER LOBE OF RIGHT LUNG (MULTI): ICD-10-CM

## 2024-09-06 DIAGNOSIS — Z17.0 MALIGNANT NEOPLASM OF CENTRAL PORTION OF RIGHT BREAST IN FEMALE, ESTROGEN RECEPTOR POSITIVE (MULTI): ICD-10-CM

## 2024-09-06 DIAGNOSIS — Z17.0 MALIGNANT NEOPLASM OF LOWER-OUTER QUADRANT OF LEFT BREAST OF FEMALE, ESTROGEN RECEPTOR POSITIVE (MULTI): ICD-10-CM

## 2024-09-06 DIAGNOSIS — C50.111 MALIGNANT NEOPLASM OF CENTRAL PORTION OF RIGHT BREAST IN FEMALE, ESTROGEN RECEPTOR POSITIVE (MULTI): ICD-10-CM

## 2024-09-06 DIAGNOSIS — C50.512 MALIGNANT NEOPLASM OF LOWER-OUTER QUADRANT OF LEFT BREAST OF FEMALE, ESTROGEN RECEPTOR POSITIVE (MULTI): ICD-10-CM

## 2024-09-06 LAB
ALBUMIN SERPL BCP-MCNC: 4.5 G/DL (ref 3.4–5)
ALP SERPL-CCNC: 68 U/L (ref 33–136)
ALT SERPL W P-5'-P-CCNC: 20 U/L (ref 7–45)
ANION GAP SERPL CALC-SCNC: 16 MMOL/L (ref 10–20)
AST SERPL W P-5'-P-CCNC: 27 U/L (ref 9–39)
BASOPHILS # BLD AUTO: 0.02 X10*3/UL (ref 0–0.1)
BASOPHILS NFR BLD AUTO: 0.4 %
BILIRUB SERPL-MCNC: 0.4 MG/DL (ref 0–1.2)
BUN SERPL-MCNC: 21 MG/DL (ref 6–23)
CALCIUM SERPL-MCNC: 8.4 MG/DL (ref 8.6–10.3)
CANCER AG27-29 SERPL-ACNC: 29.9 U/ML (ref 0–38.6)
CEA SERPL-MCNC: 1.5 UG/L
CHLORIDE SERPL-SCNC: 99 MMOL/L (ref 98–107)
CO2 SERPL-SCNC: 26 MMOL/L (ref 21–32)
CREAT SERPL-MCNC: 1.27 MG/DL (ref 0.5–1.05)
EGFRCR SERPLBLD CKD-EPI 2021: 44 ML/MIN/1.73M*2
EOSINOPHIL # BLD AUTO: 0.13 X10*3/UL (ref 0–0.4)
EOSINOPHIL NFR BLD AUTO: 2.7 %
ERYTHROCYTE [DISTWIDTH] IN BLOOD BY AUTOMATED COUNT: 13.1 % (ref 11.5–14.5)
FERRITIN SERPL-MCNC: 29 NG/ML (ref 8–150)
GLUCOSE SERPL-MCNC: 135 MG/DL (ref 74–99)
HCT VFR BLD AUTO: 33.2 % (ref 36–46)
HGB BLD-MCNC: 10.8 G/DL (ref 12–16)
IMM GRANULOCYTES # BLD AUTO: 0.02 X10*3/UL (ref 0–0.5)
IMM GRANULOCYTES NFR BLD AUTO: 0.4 % (ref 0–0.9)
IRON SATN MFR SERPL: 21 % (ref 25–45)
IRON SERPL-MCNC: 89 UG/DL (ref 35–150)
LYMPHOCYTES # BLD AUTO: 1.63 X10*3/UL (ref 0.8–3)
LYMPHOCYTES NFR BLD AUTO: 33.4 %
MCH RBC QN AUTO: 31.5 PG (ref 26–34)
MCHC RBC AUTO-ENTMCNC: 32.5 G/DL (ref 32–36)
MCV RBC AUTO: 97 FL (ref 80–100)
MONOCYTES # BLD AUTO: 0.48 X10*3/UL (ref 0.05–0.8)
MONOCYTES NFR BLD AUTO: 9.8 %
NEUTROPHILS # BLD AUTO: 2.6 X10*3/UL (ref 1.6–5.5)
NEUTROPHILS NFR BLD AUTO: 53.3 %
PLATELET # BLD AUTO: 219 X10*3/UL (ref 150–450)
POTASSIUM SERPL-SCNC: 4.3 MMOL/L (ref 3.5–5.3)
PROT SERPL-MCNC: 7 G/DL (ref 6.4–8.2)
RBC # BLD AUTO: 3.43 X10*6/UL (ref 4–5.2)
SODIUM SERPL-SCNC: 137 MMOL/L (ref 136–145)
TIBC SERPL-MCNC: 415 UG/DL (ref 240–445)
UIBC SERPL-MCNC: 326 UG/DL (ref 110–370)
WBC # BLD AUTO: 4.9 X10*3/UL (ref 4.4–11.3)

## 2024-09-06 PROCEDURE — 86300 IMMUNOASSAY TUMOR CA 15-3: CPT

## 2024-09-06 PROCEDURE — 83540 ASSAY OF IRON: CPT

## 2024-09-06 PROCEDURE — 36415 COLL VENOUS BLD VENIPUNCTURE: CPT

## 2024-09-06 PROCEDURE — 84132 ASSAY OF SERUM POTASSIUM: CPT

## 2024-09-06 PROCEDURE — 85025 COMPLETE CBC W/AUTO DIFF WBC: CPT

## 2024-09-06 PROCEDURE — 84075 ASSAY ALKALINE PHOSPHATASE: CPT

## 2024-09-06 PROCEDURE — 71260 CT THORAX DX C+: CPT

## 2024-09-06 PROCEDURE — 2550000001 HC RX 255 CONTRASTS: Performed by: INTERNAL MEDICINE

## 2024-09-06 PROCEDURE — 82728 ASSAY OF FERRITIN: CPT

## 2024-09-06 PROCEDURE — 82378 CARCINOEMBRYONIC ANTIGEN: CPT

## 2024-09-13 ENCOUNTER — OFFICE VISIT (OUTPATIENT)
Dept: HEMATOLOGY/ONCOLOGY | Facility: CLINIC | Age: 74
End: 2024-09-13
Payer: MEDICARE

## 2024-09-13 VITALS
DIASTOLIC BLOOD PRESSURE: 68 MMHG | TEMPERATURE: 97.2 F | BODY MASS INDEX: 32.45 KG/M2 | SYSTOLIC BLOOD PRESSURE: 133 MMHG | RESPIRATION RATE: 16 BRPM | OXYGEN SATURATION: 96 % | HEART RATE: 77 BPM | WEIGHT: 171.74 LBS

## 2024-09-13 DIAGNOSIS — I10 HYPERTENSION, UNSPECIFIED TYPE: ICD-10-CM

## 2024-09-13 DIAGNOSIS — Z17.0 MALIGNANT NEOPLASM OF LOWER-OUTER QUADRANT OF LEFT BREAST OF FEMALE, ESTROGEN RECEPTOR POSITIVE (MULTI): ICD-10-CM

## 2024-09-13 DIAGNOSIS — C34.11 MALIGNANT NEOPLASM OF UPPER LOBE OF RIGHT LUNG (MULTI): Primary | ICD-10-CM

## 2024-09-13 DIAGNOSIS — C50.512 MALIGNANT NEOPLASM OF LOWER-OUTER QUADRANT OF LEFT BREAST OF FEMALE, ESTROGEN RECEPTOR POSITIVE (MULTI): ICD-10-CM

## 2024-09-13 DIAGNOSIS — E78.5 HYPERLIPIDEMIA, UNSPECIFIED HYPERLIPIDEMIA TYPE: ICD-10-CM

## 2024-09-13 DIAGNOSIS — J44.9 CHRONIC OBSTRUCTIVE PULMONARY DISEASE, UNSPECIFIED COPD TYPE (MULTI): ICD-10-CM

## 2024-09-13 DIAGNOSIS — I25.10 ARTERIOSCLEROSIS OF CORONARY ARTERY: ICD-10-CM

## 2024-09-13 DIAGNOSIS — E11.9 TYPE 2 DIABETES MELLITUS WITHOUT COMPLICATION, WITHOUT LONG-TERM CURRENT USE OF INSULIN (MULTI): ICD-10-CM

## 2024-09-13 DIAGNOSIS — K21.00 GASTROESOPHAGEAL REFLUX DISEASE WITH ESOPHAGITIS WITHOUT HEMORRHAGE: ICD-10-CM

## 2024-09-13 DIAGNOSIS — M19.91 PRIMARY OSTEOARTHRITIS, UNSPECIFIED SITE: ICD-10-CM

## 2024-09-13 PROBLEM — G62.9 PERIPHERAL POLYNEUROPATHY: Status: ACTIVE | Noted: 2024-09-13

## 2024-09-13 PROCEDURE — 1126F AMNT PAIN NOTED NONE PRSNT: CPT | Performed by: INTERNAL MEDICINE

## 2024-09-13 PROCEDURE — 4010F ACE/ARB THERAPY RXD/TAKEN: CPT | Performed by: INTERNAL MEDICINE

## 2024-09-13 PROCEDURE — 1159F MED LIST DOCD IN RCRD: CPT | Performed by: INTERNAL MEDICINE

## 2024-09-13 PROCEDURE — 3078F DIAST BP <80 MM HG: CPT | Performed by: INTERNAL MEDICINE

## 2024-09-13 PROCEDURE — 99214 OFFICE O/P EST MOD 30 MIN: CPT | Performed by: INTERNAL MEDICINE

## 2024-09-13 PROCEDURE — 3075F SYST BP GE 130 - 139MM HG: CPT | Performed by: INTERNAL MEDICINE

## 2024-09-13 ASSESSMENT — PAIN SCALES - GENERAL: PAINLEVEL: 0-NO PAIN

## 2024-09-13 NOTE — PATIENT INSTRUCTIONS
See you again in 6 months after next chest CT and CT brain are done    You will try a gel for neuropathy

## 2024-09-13 NOTE — PROGRESS NOTES
Patient ID: Lisa Andrade is a 74 y.o. female.  Referring Physician: Silvano Chaudhary MD  [id number] [address]  [place] [id number]  Primary Care Provider: Quentin Jaffe MD  Visit Type: Follow Up      Subjective    HPI How was my chest CT?  My neuropathy in my feet is annoying    Review of Systems   Constitutional: Negative.    HENT:  Negative.     Eyes: Negative.    Respiratory: Negative.     Cardiovascular: Negative.    Gastrointestinal: Negative.    Endocrine: Negative.    Musculoskeletal: Negative.    Skin: Negative.    Neurological: Negative.    Hematological: Negative.    Psychiatric/Behavioral: Negative.          Objective   BSA: 1.83 meters squared  /68 (BP Location: Right arm, Patient Position: Sitting, BP Cuff Size: Adult)   Pulse 77   Temp 36.2 °C (97.2 °F) (Temporal)   Resp 16   Wt 77.9 kg (171 lb 11.8 oz)   SpO2 96%   BMI 32.45 kg/m²      has a past medical history of Breast cancer (Multi), antineoplastic chemo, Old myocardial infarction, Personal history of diseases of the blood and blood-forming organs and certain disorders involving the immune mechanism, Personal history of irradiation, Personal history of malignant neoplasm of breast, Personal history of other diseases of the circulatory system, Personal history of other diseases of the circulatory system, Personal history of other diseases of the digestive system, Personal history of other diseases of the digestive system, Personal history of other diseases of the musculoskeletal system and connective tissue, Personal history of other diseases of the musculoskeletal system and connective tissue, Personal history of other endocrine, nutritional and metabolic disease, Personal history of other endocrine, nutritional and metabolic disease, and Pure hypercholesterolemia, unspecified.   has a past surgical history that includes Other surgical history (08/23/2013); Hysterectomy (08/23/2013); Coronary angioplasty with stent  (06/20/2016); Colonoscopy (06/20/2016); Other surgical history (11/20/2021); Other surgical history (11/20/2021); Breast lumpectomy (Left, 01/18/2013); Other surgical history (03/28/2016); Other surgical history (03/28/2016); Appendectomy (03/28/2016); Cholecystectomy (03/28/2016); Cervical fusion (04/19/2016); and MR angio head wo IV contrast (08/18/2023).  Family History   Problem Relation Name Age of Onset    Hypertension Mother      Coronary artery disease Mother      Migraines Mother      No Known Problems Father      Hodgkin's lymphoma Sister      Uterine cancer Sister       Oncology History    No history exists.       Lisa Andrade  reports that she has quit smoking. Her smoking use included cigarettes. She has never used smokeless tobacco.  She  reports that she does not currently use alcohol.  She  has no history on file for drug use.    Physical Exam  Vitals reviewed.   Constitutional:       Appearance: Normal appearance.   HENT:      Head: Normocephalic.      Mouth/Throat:      Mouth: Mucous membranes are moist.   Eyes:      Extraocular Movements: Extraocular movements intact.      Pupils: Pupils are equal, round, and reactive to light.   Cardiovascular:      Rate and Rhythm: Normal rate and regular rhythm.      Pulses: Normal pulses.   Pulmonary:      Effort: Pulmonary effort is normal.      Breath sounds: Normal breath sounds.   Abdominal:      General: Bowel sounds are normal.      Palpations: Abdomen is soft.   Musculoskeletal:         General: Normal range of motion.      Cervical back: Normal range of motion and neck supple.   Skin:     General: Skin is warm.   Neurological:      General: No focal deficit present.      Mental Status: She is alert and oriented to person, place, and time.   Psychiatric:         Mood and Affect: Mood normal.         Behavior: Behavior normal.         WBC   Date/Time Value Ref Range Status   09/06/2024 08:52 AM 4.9 4.4 - 11.3 x10*3/uL Final   02/19/2024 12:30 PM 5.1  "4.4 - 11.3 x10*3/uL Final   11/03/2023 07:51 AM 5.0 4.4 - 11.3 x10*3/uL Final     nRBC   Date Value Ref Range Status   08/20/2023 0.0 0.0 - 0.0 /100 WBC Final   08/19/2023 0.0 0.0 - 0.0 /100 WBC Final   08/18/2023 0.0 0.0 - 0.0 /100 WBC Final     RBC   Date Value Ref Range Status   09/06/2024 3.43 (L) 4.00 - 5.20 x10*6/uL Final   02/19/2024 3.47 (L) 4.00 - 5.20 x10*6/uL Final   11/03/2023 3.40 (L) 4.00 - 5.20 x10*6/uL Final     Hemoglobin   Date Value Ref Range Status   09/06/2024 10.8 (L) 12.0 - 16.0 g/dL Final   02/19/2024 11.2 (L) 12.0 - 16.0 g/dL Final   11/03/2023 10.8 (L) 12.0 - 16.0 g/dL Final     Hematocrit   Date Value Ref Range Status   09/06/2024 33.2 (L) 36.0 - 46.0 % Final   02/19/2024 35.6 (L) 36.0 - 46.0 % Final   11/03/2023 33.5 (L) 36.0 - 46.0 % Final     MCV   Date/Time Value Ref Range Status   09/06/2024 08:52 AM 97 80 - 100 fL Final   02/19/2024 12:30  (H) 80 - 100 fL Final   11/03/2023 07:51 AM 99 80 - 100 fL Final     MCH   Date/Time Value Ref Range Status   09/06/2024 08:52 AM 31.5 26.0 - 34.0 pg Final   02/19/2024 12:30 PM 32.3 26.0 - 34.0 pg Final   11/03/2023 07:51 AM 31.8 26.0 - 34.0 pg Final     MCHC   Date/Time Value Ref Range Status   09/06/2024 08:52 AM 32.5 32.0 - 36.0 g/dL Final   02/19/2024 12:30 PM 31.5 (L) 32.0 - 36.0 g/dL Final   11/03/2023 07:51 AM 32.2 32.0 - 36.0 g/dL Final     RDW   Date/Time Value Ref Range Status   09/06/2024 08:52 AM 13.1 11.5 - 14.5 % Final   02/19/2024 12:30 PM 13.1 11.5 - 14.5 % Final   11/03/2023 07:51 AM 15.7 (H) 11.5 - 14.5 % Final     Platelets   Date/Time Value Ref Range Status   09/06/2024 08:52  150 - 450 x10*3/uL Final   02/19/2024 12:30  150 - 450 x10*3/uL Final   11/03/2023 07:51  150 - 450 x10*3/uL Final     No results found for: \"MPV\"  Neutrophils %   Date/Time Value Ref Range Status   09/06/2024 08:52 AM 53.3 40.0 - 80.0 % Final   02/19/2024 12:30 PM 55.0 40.0 - 80.0 % Final   11/03/2023 07:51 AM 53.3 40.0 - 80.0 % " Final     Immature Granulocytes %, Automated   Date/Time Value Ref Range Status   09/06/2024 08:52 AM 0.4 0.0 - 0.9 % Final     Comment:     Immature Granulocyte Count (IG) includes promyelocytes, myelocytes and metamyelocytes but does not include bands. Percent differential counts (%) should be interpreted in the context of the absolute cell counts (cells/UL).   02/19/2024 12:30 PM 0.4 0.0 - 0.9 % Final     Comment:     Immature Granulocyte Count (IG) includes promyelocytes, myelocytes and metamyelocytes but does not include bands. Percent differential counts (%) should be interpreted in the context of the absolute cell counts (cells/UL).   11/03/2023 07:51 AM 0.8 0.0 - 0.9 % Final     Comment:     Immature Granulocyte Count (IG) includes promyelocytes, myelocytes and metamyelocytes but does not include bands. Percent differential counts (%) should be interpreted in the context of the absolute cell counts (cells/UL).     Lymphocytes %   Date/Time Value Ref Range Status   09/06/2024 08:52 AM 33.4 13.0 - 44.0 % Final   02/19/2024 12:30 PM 31.4 13.0 - 44.0 % Final   11/03/2023 07:51 AM 33.3 13.0 - 44.0 % Final     Monocytes %   Date/Time Value Ref Range Status   09/06/2024 08:52 AM 9.8 2.0 - 10.0 % Final   02/19/2024 12:30 PM 10.4 2.0 - 10.0 % Final   11/03/2023 07:51 AM 10.0 2.0 - 10.0 % Final     Eosinophils %   Date/Time Value Ref Range Status   09/06/2024 08:52 AM 2.7 0.0 - 6.0 % Final   02/19/2024 12:30 PM 2.2 0.0 - 6.0 % Final   11/03/2023 07:51 AM 2.0 0.0 - 6.0 % Final     Basophils %   Date/Time Value Ref Range Status   09/06/2024 08:52 AM 0.4 0.0 - 2.0 % Final   02/19/2024 12:30 PM 0.6 0.0 - 2.0 % Final   11/03/2023 07:51 AM 0.6 0.0 - 2.0 % Final     Neutrophils Absolute   Date/Time Value Ref Range Status   09/06/2024 08:52 AM 2.60 1.60 - 5.50 x10*3/uL Final     Comment:     Percent differential counts (%) should be interpreted in the context of the absolute cell counts (cells/uL).   02/19/2024 12:30 PM 2.80  "1.60 - 5.50 x10*3/uL Final     Comment:     Percent differential counts (%) should be interpreted in the context of the absolute cell counts (cells/uL).   11/03/2023 07:51 AM 2.68 1.60 - 5.50 x10*3/uL Final     Comment:     Percent differential counts (%) should be interpreted in the context of the absolute cell counts (cells/uL).     Immature Granulocytes Absolute, Automated   Date/Time Value Ref Range Status   09/06/2024 08:52 AM 0.02 0.00 - 0.50 x10*3/uL Final   02/19/2024 12:30 PM 0.02 0.00 - 0.50 x10*3/uL Final   11/03/2023 07:51 AM 0.04 0.00 - 0.50 x10*3/uL Final     Lymphocytes Absolute   Date/Time Value Ref Range Status   09/06/2024 08:52 AM 1.63 0.80 - 3.00 x10*3/uL Final   02/19/2024 12:30 PM 1.60 0.80 - 3.00 x10*3/uL Final   11/03/2023 07:51 AM 1.67 0.80 - 3.00 x10*3/uL Final     Monocytes Absolute   Date/Time Value Ref Range Status   09/06/2024 08:52 AM 0.48 0.05 - 0.80 x10*3/uL Final   02/19/2024 12:30 PM 0.53 0.05 - 0.80 x10*3/uL Final   11/03/2023 07:51 AM 0.50 0.05 - 0.80 x10*3/uL Final     Eosinophils Absolute   Date/Time Value Ref Range Status   09/06/2024 08:52 AM 0.13 0.00 - 0.40 x10*3/uL Final   02/19/2024 12:30 PM 0.11 0.00 - 0.40 x10*3/uL Final   11/03/2023 07:51 AM 0.10 0.00 - 0.40 x10*3/uL Final     Basophils Absolute   Date/Time Value Ref Range Status   09/06/2024 08:52 AM 0.02 0.00 - 0.10 x10*3/uL Final   02/19/2024 12:30 PM 0.03 0.00 - 0.10 x10*3/uL Final   11/03/2023 07:51 AM 0.03 0.00 - 0.10 x10*3/uL Final       No components found for: \"PT\"  aPTT   Date/Time Value Ref Range Status   09/07/2021 06:18 AM 27 25 - 35 sec Final     Comment:       THE APTT IS NO LONGER USED FOR MONITORING     UNFRACTIONATED HEPARIN THERAPY.    FOR MONITORING HEPARIN THERAPY,     USE THE HEPARIN ASSAY.       Medication Documentation Review Audit       Reviewed by Ashlee Marquez MA (Medical Assistant) on 09/13/24 at 0954      Medication Order Taking? Sig Documenting Provider Last Dose Status   amLODIPine " (Norvasc) 5 mg tablet [id number] Yes Take 1 tablet (5 mg) by mouth once daily. Historical Provider, MD Taking Active   calcium carbonate 600 mg calcium (1,500 mg) tablet [id number] Yes Take 1 tablet (1,500 mg) by mouth 2 times a day. Historical Provider, MD Taking Active   cholecalciferol (Vitamin D-3) 25 MCG (1000 UT) tablet [id number] Yes Take 1 tablet (1,000 Units) by mouth once daily. Historical Provider, MD Taking Active   cyclobenzaprine (Flexeril) 10 mg tablet [id number] Yes Take 1 tablet (10 mg) by mouth 3 times a day as needed. Historical Provider, MD Taking Active   fluticasone-umeclidin-vilanter (Trelegy Ellipta) 100-62.5-25 mcg blister with device [id number] Yes Inhale 1 puff once daily. Historical Provider, MD Taking Active   glipiZIDE XL (Glucotrol XL) 2.5 mg 24 hr tablet [id number] Yes Take 1 tablet (2.5 mg) by mouth once daily. Historical Provider, MD Taking Active   hydroCHLOROthiazide (HYDRODiuril) 25 mg tablet [id number] Yes Take 1 tablet (25 mg) by mouth once daily. Historical Provider, MD Taking Active   HYDROcodone-acetaminophen (Norco) 5-325 mg tablet [id number] Yes Take 1 tablet by mouth every 6 hours if needed. Historical Provider, MD Taking Active   ipratropium (Atrovent) 0.02 % nebulizer solution [id number] Yes Inhale 2.5 mL (0.5 mg) 4 times a day. Historical Provider, MD Taking Active   ipratropium-albuteroL (Combivent Respimat)  mcg/actuation inhaler [id number] Yes Inhale 2 puffs every 6 hours. Historical Provider, MD Taking Active   isosorbide mononitrate ER (Imdur) 30 mg 24 hr tablet [id number] Yes Take 1 tablet (30 mg) by mouth once daily. Historical Provider, MD Taking Active   losartan (Cozaar) 100 mg tablet [id number] Yes Take 1 tablet (100 mg) by mouth once daily. Historical Provider, MD Taking Active   metFORMIN (Glucophage) 500 mg tablet [id number] Yes Take 1 tablet (500 mg) by mouth 2 times daily (morning and late afternoon). Historical Provider, MD Taking Active   metoprolol tartrate  (Lopressor) 50 mg tablet [id number] Yes Take 1 tablet by mouth twice a day. Historical Provider, MD Taking Active   nitroglycerin (Nitrostat) 0.4 mg SL tablet [id number] Yes Place 1 tablet (0.4 mg) under the tongue every 5 minutes if needed for chest pain. Historical Provider, MD Taking Active   omeprazole (PriLOSEC) 20 mg DR capsule [id number] Yes Take 1 capsule (20 mg) by mouth once daily in the morning. Take before meals. Historical Provider, MD Taking Active   pravastatin (Pravachol) 80 mg tablet [id number] Yes Take 1 tablet (80 mg) by mouth once daily. Alternate 40 mg daily and 80 mg daily Historical Provider, MD Taking Active   spironolactone (Aldactone) 25 mg tablet [id number] Yes Take 1 tablet (25 mg) by mouth once daily. Historical Provider, MD Taking Active                     Assessment/Plan    1) small cell lung cancer  -limited stage, was treated with chemoradiation  -completed 4 cycles of carboplatin + etoposide by 1/2022  -here for interval followup  -labs done on 9/6/2024 included CBC, COMP, iron panel + ferritin, CEA  -results reviewed--wbc 4.9, hgb 10.8, plt 219,000, creatinine 1.27, calcium 8.4, alk phos 68, AST 27, ALT 20, CEA 1.6, TIBC 415, sat 21%, ferritin 29  -CT chest done on 2/20/2024 reviewed- no evidence of thoracic lymphadenopathy; reticular and bandlike opacities are again seen in the medial aspect of the right upper lobe; 4.3 mm ground glass nodule in the right upper lobe; 4.7 mm ground glass nodule is seen in the right lower lobe;  4.5 mm unchanged subpleural nodule in RLL; 4 mm nodule in LLL unchanged; 2-3 mm nodule in right lobe unchanged; redemonstration of post treatment changes in the left breast  -here for interval followup-says neuropathy in feet getting worse despite taking gabapentin  -has tried cymbalta and lyrica before--for fibromyalgia--and they never helped; she is not interested in titrating up gabapentin  -CT chest done on 9/6/2024 reviewed--no endobronchial lesion; stable  appearance of bandlike opacity with associated traction bronchiectasis and volume loss in the medial right upper lobe compatible with postradiation fibrosis; moderate upper lobe predominant centrilobular and paraseptal  emphysema; no focal consolidation, pleural effusion, or pneumothorax; stable 0.4 cm LLL pulmonary nodule; stable 0.4 cm LLL nodule  -next chest CT due in 6 months  -she is also due for followup brain imaging--she does not want to do MRI--so will do head CT  -she wishes to try the neuropathy gel that is compounded by [facility]        2) breast cancer  -h/o stage IA ER+ OH+ [id number] neg breast cancer, T1bN0, intermediate Oncotype Dx score  -completed TC x 4  -s/p XRT  -currently on arimidex  -Ca 27.9 done on 2/19/2024 was 34.7     3) hypertension  -on amlodipine  -on losartan  -on metoprolol  -on HCTZ     4) CAD  -s/p cardiac cath with stent in 2007  -on ASA     5) diabetes  -on glipizide  -on metformin     6) arthritis  -on celebrex  -on plaquenil     7) GERD  -on omeprazole     8) COPD  -on combivent     9) hyperlipidemia  -on pravastatin     Problem List Items Addressed This Visit             ICD-10-CM    Malignant neoplasm of lower-outer quadrant of left female breast (Multi) C50.512    Relevant Orders    Clinic Appointment Request Follow Up; SILVANO CHAUDHARY; [facility]    CBC and Auto Differential    Comprehensive metabolic panel    CEA    CT chest w IV contrast    CT head w IV contrast            Silvano Chaudhary MD

## 2024-09-14 ASSESSMENT — ENCOUNTER SYMPTOMS
CARDIOVASCULAR NEGATIVE: 1
ENDOCRINE NEGATIVE: 1
GASTROINTESTINAL NEGATIVE: 1
PSYCHIATRIC NEGATIVE: 1
CONSTITUTIONAL NEGATIVE: 1
MUSCULOSKELETAL NEGATIVE: 1
EYES NEGATIVE: 1
RESPIRATORY NEGATIVE: 1
NEUROLOGICAL NEGATIVE: 1
HEMATOLOGIC/LYMPHATIC NEGATIVE: 1

## 2024-10-15 ENCOUNTER — OFFICE VISIT (OUTPATIENT)
Dept: PAIN MEDICINE | Facility: CLINIC | Age: 74
End: 2024-10-15
Payer: MEDICARE

## 2024-10-15 VITALS
BODY MASS INDEX: 32.28 KG/M2 | HEART RATE: 71 BPM | DIASTOLIC BLOOD PRESSURE: 70 MMHG | WEIGHT: 171 LBS | SYSTOLIC BLOOD PRESSURE: 102 MMHG | HEIGHT: 61 IN | OXYGEN SATURATION: 97 % | RESPIRATION RATE: 16 BRPM

## 2024-10-15 DIAGNOSIS — G89.29 OTHER CHRONIC PAIN: ICD-10-CM

## 2024-10-15 DIAGNOSIS — G62.0 CHEMOTHERAPY-INDUCED NEUROPATHY (MULTI): Primary | ICD-10-CM

## 2024-10-15 DIAGNOSIS — T45.1X5A CHEMOTHERAPY-INDUCED NEUROPATHY (MULTI): Primary | ICD-10-CM

## 2024-10-15 PROCEDURE — 99214 OFFICE O/P EST MOD 30 MIN: CPT | Performed by: ANESTHESIOLOGY

## 2024-10-15 PROCEDURE — 1125F AMNT PAIN NOTED PAIN PRSNT: CPT | Performed by: ANESTHESIOLOGY

## 2024-10-15 PROCEDURE — 3074F SYST BP LT 130 MM HG: CPT | Performed by: ANESTHESIOLOGY

## 2024-10-15 PROCEDURE — 1160F RVW MEDS BY RX/DR IN RCRD: CPT | Performed by: ANESTHESIOLOGY

## 2024-10-15 PROCEDURE — 1036F TOBACCO NON-USER: CPT | Performed by: ANESTHESIOLOGY

## 2024-10-15 PROCEDURE — 1159F MED LIST DOCD IN RCRD: CPT | Performed by: ANESTHESIOLOGY

## 2024-10-15 PROCEDURE — 3078F DIAST BP <80 MM HG: CPT | Performed by: ANESTHESIOLOGY

## 2024-10-15 PROCEDURE — 4010F ACE/ARB THERAPY RXD/TAKEN: CPT | Performed by: ANESTHESIOLOGY

## 2024-10-15 PROCEDURE — 99204 OFFICE O/P NEW MOD 45 MIN: CPT | Performed by: ANESTHESIOLOGY

## 2024-10-15 ASSESSMENT — ENCOUNTER SYMPTOMS
NUMBNESS: 1
BACK PAIN: 1
PSYCHIATRIC NEGATIVE: 1
ARTHRALGIAS: 1
ENDOCRINE NEGATIVE: 1
MYALGIAS: 1
WEAKNESS: 1
EYES NEGATIVE: 1
JOINT SWELLING: 1
OCCASIONAL FEELINGS OF UNSTEADINESS: 1
HEMATOLOGIC/LYMPHATIC NEGATIVE: 1
CARDIOVASCULAR NEGATIVE: 1
ALLERGIC/IMMUNOLOGIC NEGATIVE: 1
GASTROINTESTINAL NEGATIVE: 1
CONSTITUTIONAL NEGATIVE: 1
RESPIRATORY NEGATIVE: 1
NECK PAIN: 1
LOSS OF SENSATION IN FEET: 1
NECK STIFFNESS: 1
DEPRESSION: 0

## 2024-10-15 ASSESSMENT — PATIENT HEALTH QUESTIONNAIRE - PHQ9
SUM OF ALL RESPONSES TO PHQ9 QUESTIONS 1 AND 2: 0
1. LITTLE INTEREST OR PLEASURE IN DOING THINGS: NOT AT ALL
2. FEELING DOWN, DEPRESSED OR HOPELESS: NOT AT ALL

## 2024-10-15 ASSESSMENT — PAIN SCALES - GENERAL
PAINLEVEL_OUTOF10: 6
PAINLEVEL: 6

## 2024-10-15 ASSESSMENT — LIFESTYLE VARIABLES: TOTAL SCORE: 1

## 2024-10-15 ASSESSMENT — PAIN DESCRIPTION - DESCRIPTORS: DESCRIPTORS: SHOOTING

## 2024-10-15 ASSESSMENT — PAIN - FUNCTIONAL ASSESSMENT: PAIN_FUNCTIONAL_ASSESSMENT: 0-10

## 2024-10-15 NOTE — PROGRESS NOTES
Subjective   Patient ID: Lisa Andrade is a 74 y.o. female who presents for Back Pain.  Back Pain  Associated symptoms include numbness and weakness.     Patient here today for a new patient evaluation of her chemo-induced neuropathy.  She had breast CA in 2013 and lung Ca in 2021.  She had small amount of neuropathy with the breast cancer the with the lung cancer treatment her neuropathy got much worse.  She reports that her has pain in botht he legs and the arms.  Her legs and feet are the worse.  She has a hard time falling and staying asleep.  She has a pulling sensation in her legs.  She has a numbness up to her knees.  She symptoms are worse in the evening time typically.  Now she has these zapping increases in her pain.  She feels like her hands and feet are twisting and turning on her.  She has been on numeerous treatments.  She has tried a TENS unit.  She as been on gabapentin, pregablin, and cymbalta.  She has been placed on Norco and she does ok with this.  Patient did not undergo any surgery for her lung cancer.    Low Back  She had lumbar surgery in 2003 in [place].  She has had back pain for many years.  Then she has pain in the high lumbar and thoracic area and the pain will wrap around her chest and takes her breath away.  When she move her neck it will also cause pain.  She olga done PT for her back and neck in the past.  She did go in 2017 to a pain practice and underwent injections and PT and she did not have any benefit from the interventions.    Review of Systems   Constitutional: Negative.    HENT: Negative.     Eyes: Negative.    Respiratory: Negative.     Cardiovascular: Negative.    Gastrointestinal: Negative.    Endocrine: Negative.    Genitourinary: Negative.    Musculoskeletal:  Positive for arthralgias, back pain, gait problem, joint swelling, myalgias, neck pain and neck stiffness.   Skin: Negative.    Allergic/Immunologic: Negative.    Neurological:  Positive for weakness and numbness.    Hematological: Negative.    Psychiatric/Behavioral: Negative.         Objective   Physical Exam  Vitals and nursing note reviewed.   Constitutional:       Appearance: Normal appearance.   HENT:      Head: Normocephalic and atraumatic.      Right Ear: Ear canal and external ear normal.      Left Ear: Ear canal and external ear normal.      Nose: Nose normal.      Mouth/Throat:      Mouth: Mucous membranes are moist.      Pharynx: Oropharynx is clear.   Eyes:      Conjunctiva/sclera: Conjunctivae normal.      Pupils: Pupils are equal, round, and reactive to light.   Cardiovascular:      Rate and Rhythm: Normal rate.   Pulmonary:      Effort: Pulmonary effort is normal. No respiratory distress.   Musculoskeletal:      Cervical back: Normal range of motion and neck supple. Tenderness present. Pain with movement present. Normal range of motion.      Lumbar back: Tenderness present. No bony tenderness. Decreased range of motion. Negative right straight leg raise test and negative left straight leg raise test.        Back:    Skin:     General: Skin is warm and dry.   Neurological:      Mental Status: She is alert and oriented to person, place, and time.      Sensory: Sensory deficit (Stocking-glove loss of sensation in hands and feet to wrists and knees) present.      Motor: Motor function is intact.      Coordination: Coordination is intact.      Gait: Gait is intact.      Comments: Randi sign negative bilaterally   Psychiatric:         Mood and Affect: Mood normal.         Thought Content: Thought content normal.         Assessment/Plan   Problem List Items Addressed This Visit    None  Visit Diagnoses         Codes    Chemotherapy-induced neuropathy (Multi)    -  Primary G62.0, T45.1X5A    Other chronic pain     G89.29             I nice discussion with the patient today our plan will be as follows.    Radiology: Patient has x-ray from 2 years ago of lumbar spine which does show spondylosis throughout the lumbar  spine.    Physically: Patient should continue home exercise regimen as well as lifestyle modifications to avoid increases in pain.    Psychologically: No behavioral health issues noted at this time.    Medication: No changes to medication regimen at this time.    Duration: Greater than 5 years.    Intervention: Patient suffers with worsening chemotherapy-induced neuropathy after her second cancer treatment for lung cancer.  She has severe pain in her feet as well as her hands as described in the HPI above.  She is an excellent candidate for scrambler therapy.    Risks, benefit, and alternatives of the procedure were discussed with the patient.  Oswestry score has been compelted and recorded.    Dermatome mapping  Lower extremity: L5 and S1  Upper extremity: C6 and C8    The patient also expresses radicular thoracic pain as well.  She would like to hold off on any spinal work up until the scrambler therapy has been completed.  We also discussed neuromodulation for her neuropathy which she will consider in the future.      Santy Hyatt MD 10/15/24 1:20 PM

## 2024-10-21 ENCOUNTER — APPOINTMENT (OUTPATIENT)
Dept: CARDIOLOGY | Facility: CLINIC | Age: 74
End: 2024-10-21
Payer: MEDICARE

## 2024-11-18 ENCOUNTER — APPOINTMENT (OUTPATIENT)
Dept: CARDIOLOGY | Facility: CLINIC | Age: 74
End: 2024-11-18
Payer: MEDICARE

## 2024-11-18 VITALS
HEART RATE: 76 BPM | WEIGHT: 180 LBS | DIASTOLIC BLOOD PRESSURE: 73 MMHG | BODY MASS INDEX: 33.99 KG/M2 | HEIGHT: 61 IN | SYSTOLIC BLOOD PRESSURE: 118 MMHG

## 2024-11-18 DIAGNOSIS — I25.10 ARTERIOSCLEROSIS OF CORONARY ARTERY: ICD-10-CM

## 2024-11-18 DIAGNOSIS — I10 HYPERTENSION, UNSPECIFIED TYPE: ICD-10-CM

## 2024-11-18 DIAGNOSIS — I49.3 PREMATURE VENTRICULAR CONTRACTIONS: ICD-10-CM

## 2024-11-18 DIAGNOSIS — Z87.891 FORMER SMOKER: ICD-10-CM

## 2024-11-18 DIAGNOSIS — E78.5 HYPERLIPIDEMIA, UNSPECIFIED HYPERLIPIDEMIA TYPE: ICD-10-CM

## 2024-11-18 DIAGNOSIS — E66.811 OBESITY (BMI 30.0-34.9): ICD-10-CM

## 2024-11-18 PROCEDURE — 99213 OFFICE O/P EST LOW 20 MIN: CPT | Performed by: INTERNAL MEDICINE

## 2024-11-18 PROCEDURE — 1159F MED LIST DOCD IN RCRD: CPT | Performed by: INTERNAL MEDICINE

## 2024-11-18 PROCEDURE — 3078F DIAST BP <80 MM HG: CPT | Performed by: INTERNAL MEDICINE

## 2024-11-18 PROCEDURE — 3074F SYST BP LT 130 MM HG: CPT | Performed by: INTERNAL MEDICINE

## 2024-11-18 PROCEDURE — 1036F TOBACCO NON-USER: CPT | Performed by: INTERNAL MEDICINE

## 2024-11-18 PROCEDURE — 4010F ACE/ARB THERAPY RXD/TAKEN: CPT | Performed by: INTERNAL MEDICINE

## 2024-11-18 ASSESSMENT — ENCOUNTER SYMPTOMS
RESPIRATORY NEGATIVE: 1
CARDIOVASCULAR NEGATIVE: 1
CONSTITUTIONAL NEGATIVE: 1
NEUROLOGICAL NEGATIVE: 1

## 2024-11-18 NOTE — PROGRESS NOTES
Referred by Dr. Asif ref. provider found provider found for   Chief Complaint   Patient presents with    Follow-up        History of Present Illness  Lisa Andrade is a 74 y.o. year old female patient doing well from a cardiac standpoint no complaint no symptoms of chest pain or shortness of breath.  Denies symptoms palpitations syncope or presyncope.  Apparently there were lab orders ordered by her primary care physician I am not sure whether cholesterol is part of it therefore I will order lipid panel to her labs.  Will continue medication will call for any problem and follow-up as scheduled    Past Medical History  Past Medical History:   Diagnosis Date    Breast cancer (Multi)     Hx antineoplastic chemo     Old myocardial infarction     History of myocardial infarction    Personal history of diseases of the blood and blood-forming organs and certain disorders involving the immune mechanism     History of anemia    Personal history of irradiation     Personal history of malignant neoplasm of breast     History of malignant neoplasm of female breast    Personal history of other diseases of the circulatory system     History of hypertension    Personal history of other diseases of the circulatory system     History of cardiac murmur    Personal history of other diseases of the digestive system     History of esophageal reflux    Personal history of other diseases of the digestive system     History of hiatal hernia    Personal history of other diseases of the musculoskeletal system and connective tissue     History of backache    Personal history of other diseases of the musculoskeletal system and connective tissue     History of rheumatoid arthritis    Personal history of other endocrine, nutritional and metabolic disease     History of diabetes mellitus    Personal history of other endocrine, nutritional and metabolic disease     History of hypokalemia    Pure hypercholesterolemia, unspecified     High  cholesterol       Social History  Social History     Tobacco Use    Smoking status: Former     Types: Cigarettes    Smokeless tobacco: Never   Substance Use Topics    Alcohol use: Not Currently    Drug use: Never       Family History     Family History   Problem Relation Name Age of Onset    Hypertension Mother      Coronary artery disease Mother      Migraines Mother      No Known Problems Father      Hodgkin's lymphoma Sister      Uterine cancer Sister         Review of Systems  As per HPI, all other systems reviewed and negative.    Allergies:  Allergies   Allergen Reactions    Fish Oil Unknown    Lipitor [Atorvastatin] Unknown    Wasp Venom Unknown    Zocor [Simvastatin] Unknown        Outpatient Medications:  Current Outpatient Medications   Medication Instructions    amLODIPine (NORVASC) 5 mg, Daily    calcium carbonate 600 mg calcium (1,500 mg) tablet 1 tablet, 2 times daily    cholecalciferol (VITAMIN D-3) 1,000 Units, Daily    cyclobenzaprine (FLEXERIL) 10 mg, 3 times daily PRN    fluticasone-umeclidin-vilanter (Trelegy Ellipta) 100-62.5-25 mcg blister with device 1 puff, Daily    glipiZIDE XL (Glucotrol XL) 2.5 mg 24 hr tablet 1 tablet, Daily    hydroCHLOROthiazide (HYDRODIURIL) 25 mg, Daily RT    HYDROcodone-acetaminophen (Norco) 5-325 mg tablet 1 tablet, Every 6 hours PRN    ipratropium (ATROVENT) 0.5 mg, 4 times daily    ipratropium-albuteroL (Combivent Respimat)  mcg/actuation inhaler 2 puffs, Every 6 hours    isosorbide mononitrate ER (Imdur) 30 mg 24 hr tablet 1 tablet, oral, Daily    losartan (COZAAR) 100 mg, Daily    metFORMIN (GLUCOPHAGE) 500 mg, 2 times daily (morning and late afternoon)    metoprolol tartrate (LOPRESSOR) 50 mg, 2 times daily    nitroglycerin (NITROSTAT) 0.4 mg, Every 5 min PRN    omeprazole (PRILOSEC) 20 mg, Daily before breakfast    pravastatin (PRAVACHOL) 80 mg, Daily    spironolactone (ALDACTONE) 25 mg, Daily         Vitals:  Vitals:    11/18/24 1425   BP: 118/73    Pulse: 76       Physical Exam:  Physical Exam  Constitutional:       Appearance: She is obese.   Neck:      Vascular: No carotid bruit.   Cardiovascular:      Rate and Rhythm: Normal rate and regular rhythm.      Pulses: Normal pulses.   Pulmonary:      Effort: Pulmonary effort is normal.      Breath sounds: Normal breath sounds.   Musculoskeletal:         General: Normal range of motion.   Skin:     General: Skin is warm and dry.   Neurological:      General: No focal deficit present.      Mental Status: She is alert and oriented to person, place, and time.         Review of Systems   Constitutional: Negative.    Respiratory: Negative.     Cardiovascular: Negative.    Neurological: Negative.          Assessment/Plan   Problem List Items Addressed This Visit             ICD-10-CM    Hyperlipidemia E78.5    Premature ventricular contractions I49.3    Hypertension I10    Arteriosclerosis of coronary artery I25.10    Obesity (BMI 30.0-34.9) E66.811    Former smoker Z87.891       Scribe Attestation  By signing my name below, Kathy LOPEZ LPN  , Scribe   attest that this documentation has been prepared under the direction and in the presence of Charles Bellamy MD.      Charles Bellamy MD [facility]  Interventional Cardiology   of [facility]     Thank you for allowing me to participate in the care of this patient. Please do not hesitate to contact me with any further questions or concerns.

## 2024-12-16 ENCOUNTER — OFFICE VISIT (OUTPATIENT)
Dept: PAIN MEDICINE | Facility: CLINIC | Age: 74
End: 2024-12-16
Payer: MEDICARE

## 2024-12-16 VITALS
SYSTOLIC BLOOD PRESSURE: 120 MMHG | WEIGHT: 171 LBS | HEART RATE: 78 BPM | RESPIRATION RATE: 22 BRPM | OXYGEN SATURATION: 99 % | DIASTOLIC BLOOD PRESSURE: 70 MMHG | BODY MASS INDEX: 29.19 KG/M2 | HEIGHT: 64 IN

## 2024-12-16 DIAGNOSIS — G62.0 CHEMOTHERAPY-INDUCED PERIPHERAL NEUROPATHY (MULTI): Primary | ICD-10-CM

## 2024-12-16 DIAGNOSIS — T45.1X5A CHEMOTHERAPY-INDUCED PERIPHERAL NEUROPATHY (MULTI): Primary | ICD-10-CM

## 2024-12-16 PROCEDURE — 3074F SYST BP LT 130 MM HG: CPT | Performed by: PHYSICIAN ASSISTANT

## 2024-12-16 PROCEDURE — 99215 OFFICE O/P EST HI 40 MIN: CPT | Performed by: PHYSICIAN ASSISTANT

## 2024-12-16 PROCEDURE — 1159F MED LIST DOCD IN RCRD: CPT | Performed by: PHYSICIAN ASSISTANT

## 2024-12-16 PROCEDURE — 1160F RVW MEDS BY RX/DR IN RCRD: CPT | Performed by: PHYSICIAN ASSISTANT

## 2024-12-16 PROCEDURE — 4010F ACE/ARB THERAPY RXD/TAKEN: CPT | Performed by: PHYSICIAN ASSISTANT

## 2024-12-16 PROCEDURE — 3078F DIAST BP <80 MM HG: CPT | Performed by: PHYSICIAN ASSISTANT

## 2024-12-16 PROCEDURE — 1036F TOBACCO NON-USER: CPT | Performed by: PHYSICIAN ASSISTANT

## 2024-12-16 PROCEDURE — 1125F AMNT PAIN NOTED PAIN PRSNT: CPT | Performed by: PHYSICIAN ASSISTANT

## 2024-12-16 ASSESSMENT — ENCOUNTER SYMPTOMS
NECK STIFFNESS: 1
MYALGIAS: 1
NECK PAIN: 1
RESPIRATORY NEGATIVE: 1
EYES NEGATIVE: 1
ENDOCRINE NEGATIVE: 1
JOINT SWELLING: 1
CARDIOVASCULAR NEGATIVE: 1
GASTROINTESTINAL NEGATIVE: 1
HEMATOLOGIC/LYMPHATIC NEGATIVE: 1
PSYCHIATRIC NEGATIVE: 1
ARTHRALGIAS: 1
CONSTITUTIONAL NEGATIVE: 1
NUMBNESS: 1
WEAKNESS: 1
BACK PAIN: 1
ALLERGIC/IMMUNOLOGIC NEGATIVE: 1

## 2024-12-16 ASSESSMENT — PAIN - FUNCTIONAL ASSESSMENT: PAIN_FUNCTIONAL_ASSESSMENT: 0-10

## 2024-12-16 ASSESSMENT — PATIENT HEALTH QUESTIONNAIRE - PHQ9
2. FEELING DOWN, DEPRESSED OR HOPELESS: NOT AT ALL
SUM OF ALL RESPONSES TO PHQ9 QUESTIONS 1 & 2: 0
1. LITTLE INTEREST OR PLEASURE IN DOING THINGS: NOT AT ALL

## 2024-12-16 ASSESSMENT — PAIN SCALES - GENERAL
PAINLEVEL_OUTOF10: 9
PAINLEVEL_OUTOF10: 9

## 2024-12-16 NOTE — PROGRESS NOTES
Subjective   Patient ID: Lisa Andrade is a 74 y.o. female who presents for No chief complaint on file..  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 she had breast CA in 2013 and lung Ca in 2021.  She had small amount of neuropathy with the breast cancer the with the lung cancer treatment her neuropathy got much worse.  She reports that her has pain in botht he legs and the arms.  Her legs and feet are the worse.  She has a hard time falling and staying asleep.  She has a pulling sensation in her legs.  She has a numbness up to her knees.  She symptoms are worse in the evening time typically.  Now she has these zapping increases in her pain.  She feels like her hands and feet are twisting and turning on her.  Patient has not been on gabapentin or Lyrica for greater than 72 hours.  Utilize Norco for pain relief.  Generally without the pain medication she is a 10 out of 10 currently she is stating a 3 out of 10    Patient is here today for her lower extremity scrambler treatments.  She is rating her pain a 3 out of 10.  It is associated with bilateral knee stocking glove below with associated burning shooting and numbness.       Review of Systems   Constitutional: Negative.    HENT: Negative.     Eyes: Negative.    Respiratory: Negative.     Cardiovascular: Negative.    Gastrointestinal: Negative.    Endocrine: Negative.    Genitourinary: Negative.    Musculoskeletal:  Positive for arthralgias, back pain, gait problem, joint swelling, myalgias, neck pain and neck stiffness.   Skin: Negative.    Allergic/Immunologic: Negative.    Neurological:  Positive for weakness and numbness.   Hematological: Negative.    Psychiatric/Behavioral: Negative.         Objective   Physical Exam  Musculoskeletal:        Legs:        Assessment/Plan   Problem List Items Addressed This Visit    None    TREATMENT PLAN:  Day [ 1 ] of Scrambler Therapy.   Verbal consent obtained.  Session initiated by myself with proper  electrode/lead placement to applicable dermatomes.   Patient tolerated treatment well .  Encouraged patient to keep a diary or log of symptoms to monitor for any changes in pain, sensation, etc.    Recommend follow-up as needed.       PROCEDURE NOTE:  Treatment [ 1 ] with Scrambler therapy was initiated by myself.   Verbal consent obtained from patient.  Therapy start time: [ 913  ]   Leads were applied to: [ Bilateral L5 and S1 ]  therapeutic dose :  [  1500  ]    Patient tolerated treatment [ well  ] .  Pain reported at start of therapy session was about a [  3/10 ].  Pain reported at end of therapy session;  [ 3/10  ] .  Leads were removed and patient left the office without any difficulty.  60 min total scrambler treatment time.         Jamal Rinaldi PA-C 12/16/24 9:00 AM

## 2024-12-17 ENCOUNTER — OFFICE VISIT (OUTPATIENT)
Dept: PAIN MEDICINE | Facility: CLINIC | Age: 74
End: 2024-12-17
Payer: MEDICARE

## 2024-12-17 VITALS
HEART RATE: 67 BPM | OXYGEN SATURATION: 95 % | BODY MASS INDEX: 29.19 KG/M2 | WEIGHT: 171 LBS | DIASTOLIC BLOOD PRESSURE: 70 MMHG | RESPIRATION RATE: 16 BRPM | SYSTOLIC BLOOD PRESSURE: 142 MMHG | HEIGHT: 64 IN

## 2024-12-17 DIAGNOSIS — G62.0 CHEMOTHERAPY-INDUCED PERIPHERAL NEUROPATHY (MULTI): Primary | ICD-10-CM

## 2024-12-17 DIAGNOSIS — T45.1X5A CHEMOTHERAPY-INDUCED PERIPHERAL NEUROPATHY (MULTI): Primary | ICD-10-CM

## 2024-12-17 PROCEDURE — 1036F TOBACCO NON-USER: CPT | Performed by: PHYSICIAN ASSISTANT

## 2024-12-17 PROCEDURE — 99215 OFFICE O/P EST HI 40 MIN: CPT | Performed by: PHYSICIAN ASSISTANT

## 2024-12-17 PROCEDURE — 3078F DIAST BP <80 MM HG: CPT | Performed by: PHYSICIAN ASSISTANT

## 2024-12-17 PROCEDURE — 1159F MED LIST DOCD IN RCRD: CPT | Performed by: PHYSICIAN ASSISTANT

## 2024-12-17 PROCEDURE — 1160F RVW MEDS BY RX/DR IN RCRD: CPT | Performed by: PHYSICIAN ASSISTANT

## 2024-12-17 PROCEDURE — 3077F SYST BP >= 140 MM HG: CPT | Performed by: PHYSICIAN ASSISTANT

## 2024-12-17 PROCEDURE — 1125F AMNT PAIN NOTED PAIN PRSNT: CPT | Performed by: PHYSICIAN ASSISTANT

## 2024-12-17 PROCEDURE — 4010F ACE/ARB THERAPY RXD/TAKEN: CPT | Performed by: PHYSICIAN ASSISTANT

## 2024-12-17 ASSESSMENT — PAIN - FUNCTIONAL ASSESSMENT: PAIN_FUNCTIONAL_ASSESSMENT: 0-10

## 2024-12-17 ASSESSMENT — ENCOUNTER SYMPTOMS
RESPIRATORY NEGATIVE: 1
GASTROINTESTINAL NEGATIVE: 1
NUMBNESS: 1
ENDOCRINE NEGATIVE: 1
JOINT SWELLING: 1
NECK STIFFNESS: 1
EYES NEGATIVE: 1
BACK PAIN: 1
NECK PAIN: 1
ALLERGIC/IMMUNOLOGIC NEGATIVE: 1
ARTHRALGIAS: 1
PSYCHIATRIC NEGATIVE: 1
MYALGIAS: 1
CONSTITUTIONAL NEGATIVE: 1
HEMATOLOGIC/LYMPHATIC NEGATIVE: 1
CARDIOVASCULAR NEGATIVE: 1
WEAKNESS: 1

## 2024-12-17 ASSESSMENT — PATIENT HEALTH QUESTIONNAIRE - PHQ9
2. FEELING DOWN, DEPRESSED OR HOPELESS: NOT AT ALL
1. LITTLE INTEREST OR PLEASURE IN DOING THINGS: NOT AT ALL
SUM OF ALL RESPONSES TO PHQ9 QUESTIONS 1 AND 2: 0

## 2024-12-17 ASSESSMENT — PAIN SCALES - GENERAL
PAINLEVEL_OUTOF10: 3
PAINLEVEL_OUTOF10: 3

## 2024-12-17 ASSESSMENT — PAIN DESCRIPTION - DESCRIPTORS: DESCRIPTORS: BURNING

## 2024-12-17 NOTE — PROGRESS NOTES
Subjective   Patient ID: Lisa Andrade is a 74 y.o. female who presents for No chief complaint on file..  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 she had breast CA in 2013 and lung Ca in 2021.  She had small amount of neuropathy with the breast cancer the with the lung cancer treatment her neuropathy got much worse.  She reports that her has pain in botht he legs and the arms.  Her legs and feet are the worse.  She has a hard time falling and staying asleep.  She has a pulling sensation in her legs.  She has a numbness up to her knees.  She symptoms are worse in the evening time typically.  Now she has these zapping increases in her pain.  She feels like her hands and feet are twisting and turning on her.  Patient has not been on gabapentin or Lyrica for greater than 72 hours.  Utilize Norco for pain relief.  Generally without the pain medication she is a 10 out of 10 currently she is stating a 3 out of 10    She is rating her pain a 3 out of 10.  It is associated with bilateral knee stocking glove below with associated burning shooting and numbness.  Last night she started developing lateral knee to ankle shooting pains on the left and right posterior calf shooting from the popliteal space to mid calf.  This was rather intense it has resumed back to normal levels.  She states that she may have tolerated more stimulation than she should have.      Review of Systems   Constitutional: Negative.    HENT: Negative.     Eyes: Negative.    Respiratory: Negative.     Cardiovascular: Negative.    Gastrointestinal: Negative.    Endocrine: Negative.    Genitourinary: Negative.    Musculoskeletal:  Positive for arthralgias, back pain, gait problem, joint swelling, myalgias, neck pain and neck stiffness.   Skin: Negative.    Allergic/Immunologic: Negative.    Neurological:  Positive for weakness and numbness.   Hematological: Negative.    Psychiatric/Behavioral: Negative.         Objective   Physical  Exam  Musculoskeletal:        Legs:        Assessment/Plan   Problem List Items Addressed This Visit             ICD-10-CM    Chemotherapy-induced peripheral neuropathy (Multi) - Primary G62.0, T45.1X5A        TREATMENT PLAN:  Day [ 1 ] of Scrambler Therapy.   Verbal consent obtained.  Session initiated by myself with proper electrode/lead placement to applicable dermatomes.   Patient tolerated treatment well .  Encouraged patient to keep a diary or log of symptoms to monitor for any changes in pain, sensation, etc.    Recommend follow-up as needed.       PROCEDURE NOTE:  Treatment [ 1 ] with Scrambler therapy was initiated by myself.   Verbal consent obtained from patient.  Therapy start time: [ 902  ]   Leads were applied to: [ Bilateral L5 and S1 ]  therapeutic dose :  [  1300  ]     Patient tolerated treatment [ well  ] .  Pain reported at start of therapy session was about a [  3/10 ].  Pain reported at end of therapy session;  [ 3/10  ] .  Leads were removed and patient left the office without any difficulty.  60 min total scrambler treatment time.    Jamal Rinaldi PA-C 12/17/24 9:10 AM

## 2024-12-18 ENCOUNTER — OFFICE VISIT (OUTPATIENT)
Dept: PAIN MEDICINE | Facility: CLINIC | Age: 74
End: 2024-12-18
Payer: MEDICARE

## 2024-12-18 VITALS
BODY MASS INDEX: 29.19 KG/M2 | SYSTOLIC BLOOD PRESSURE: 130 MMHG | OXYGEN SATURATION: 97 % | HEART RATE: 70 BPM | WEIGHT: 171 LBS | HEIGHT: 64 IN | RESPIRATION RATE: 16 BRPM | DIASTOLIC BLOOD PRESSURE: 62 MMHG

## 2024-12-18 DIAGNOSIS — T45.1X5A CHEMOTHERAPY-INDUCED PERIPHERAL NEUROPATHY (MULTI): Primary | ICD-10-CM

## 2024-12-18 DIAGNOSIS — G62.0 CHEMOTHERAPY-INDUCED PERIPHERAL NEUROPATHY (MULTI): Primary | ICD-10-CM

## 2024-12-18 PROCEDURE — 3075F SYST BP GE 130 - 139MM HG: CPT | Performed by: PHYSICIAN ASSISTANT

## 2024-12-18 PROCEDURE — 3078F DIAST BP <80 MM HG: CPT | Performed by: PHYSICIAN ASSISTANT

## 2024-12-18 PROCEDURE — 1159F MED LIST DOCD IN RCRD: CPT | Performed by: PHYSICIAN ASSISTANT

## 2024-12-18 PROCEDURE — 1160F RVW MEDS BY RX/DR IN RCRD: CPT | Performed by: PHYSICIAN ASSISTANT

## 2024-12-18 PROCEDURE — 1125F AMNT PAIN NOTED PAIN PRSNT: CPT | Performed by: PHYSICIAN ASSISTANT

## 2024-12-18 PROCEDURE — 4010F ACE/ARB THERAPY RXD/TAKEN: CPT | Performed by: PHYSICIAN ASSISTANT

## 2024-12-18 PROCEDURE — 99215 OFFICE O/P EST HI 40 MIN: CPT | Performed by: PHYSICIAN ASSISTANT

## 2024-12-18 PROCEDURE — 1036F TOBACCO NON-USER: CPT | Performed by: PHYSICIAN ASSISTANT

## 2024-12-18 ASSESSMENT — ENCOUNTER SYMPTOMS
NUMBNESS: 1
NECK STIFFNESS: 1
WEAKNESS: 1
NECK PAIN: 1
JOINT SWELLING: 1
CARDIOVASCULAR NEGATIVE: 1
ALLERGIC/IMMUNOLOGIC NEGATIVE: 1
ENDOCRINE NEGATIVE: 1
HEMATOLOGIC/LYMPHATIC NEGATIVE: 1
ARTHRALGIAS: 1
RESPIRATORY NEGATIVE: 1
BACK PAIN: 1
CONSTITUTIONAL NEGATIVE: 1
GASTROINTESTINAL NEGATIVE: 1
EYES NEGATIVE: 1
PSYCHIATRIC NEGATIVE: 1
LEG PAIN: 1
MYALGIAS: 1

## 2024-12-18 ASSESSMENT — PAIN SCALES - GENERAL
PAINLEVEL_OUTOF10: 4
PAINLEVEL_OUTOF10: 4

## 2024-12-18 ASSESSMENT — PAIN - FUNCTIONAL ASSESSMENT: PAIN_FUNCTIONAL_ASSESSMENT: 0-10

## 2024-12-18 ASSESSMENT — PAIN DESCRIPTION - DESCRIPTORS: DESCRIPTORS: ACHING;BURNING

## 2024-12-18 NOTE — PROGRESS NOTES
Subjective   Patient ID: Lisa Andrade is a 74 y.o. female who presents for Leg Pain.  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 she had breast CA in 2013 and lung Ca in 2021.  She had small amount of neuropathy with the breast cancer the with the lung cancer treatment her neuropathy got much worse.  She reports that her has pain in botht he legs and the arms.  Her legs and feet are the worse.  She has a hard time falling and staying asleep.  She has a pulling sensation in her legs.  She has a numbness up to her knees.  She symptoms are worse in the evening time typically.  Now she has these zapping increases in her pain.  She feels like her hands and feet are twisting and turning on her.  Patient has not been on gabapentin or Lyrica for greater than 72 hours.  Utilize Norco for pain relief.  Generally without the pain medication she is a 10 out of 10 currently she is stating a 3 out of 10    She is rating her pain a 3 out of 10.  It is associated with bilateral knee stocking glove below with associated burning shooting and numbness.  Again but to a lesser degree and only the RLE pt had shoot posterior lateral shooting pain for about 3 hrs post treatment.     Leg Pain   Associated symptoms include numbness.       Review of Systems   Constitutional: Negative.    HENT: Negative.     Eyes: Negative.    Respiratory: Negative.     Cardiovascular: Negative.    Gastrointestinal: Negative.    Endocrine: Negative.    Genitourinary: Negative.    Musculoskeletal:  Positive for arthralgias, back pain, gait problem, joint swelling, myalgias, neck pain and neck stiffness.   Skin: Negative.    Allergic/Immunologic: Negative.    Neurological:  Positive for weakness and numbness.   Hematological: Negative.    Psychiatric/Behavioral: Negative.         Objective   Physical Exam  Musculoskeletal:        Legs:        Assessment/Plan   Problem List Items Addressed This Visit    None    TREATMENT PLAN:  Day [ 3 ]  of Scrambler Therapy.   Verbal consent obtained.  Session initiated by myself with proper electrode/lead placement to applicable dermatomes.   Patient tolerated treatment well .  Encouraged patient to keep a diary or log of symptoms to monitor for any changes in pain, sensation, etc.    Recommend follow-up as needed.       PROCEDURE NOTE:  Treatment [ 3 ] with Scrambler therapy was initiated by myself.   Verbal consent obtained from patient.  Therapy start time: [ 915  ]   Leads were applied to: [ Bilateral L5 and S1 ]  therapeutic dose :  [  1000  ]     Patient tolerated treatment [ we are reducing the level of stimulation due to shooting symptoms ] .  Pain reported at start of therapy session was about a [  3/10 ].  Pain reported at end of therapy session;  [ 3/10  ] .  Leads were removed and patient left the office without any difficulty.  60 min total scrambler treatment time.       Jamal Rinaldi PA-C 12/18/24 9:49 AM

## 2024-12-19 ENCOUNTER — OFFICE VISIT (OUTPATIENT)
Dept: PAIN MEDICINE | Facility: CLINIC | Age: 74
End: 2024-12-19
Payer: MEDICARE

## 2024-12-19 VITALS
DIASTOLIC BLOOD PRESSURE: 60 MMHG | RESPIRATION RATE: 22 BRPM | BODY MASS INDEX: 29.19 KG/M2 | HEIGHT: 64 IN | SYSTOLIC BLOOD PRESSURE: 130 MMHG | OXYGEN SATURATION: 99 % | WEIGHT: 171 LBS | HEART RATE: 61 BPM

## 2024-12-19 DIAGNOSIS — M54.9 MID BACK PAIN: ICD-10-CM

## 2024-12-19 DIAGNOSIS — G62.0 CHEMOTHERAPY-INDUCED PERIPHERAL NEUROPATHY (MULTI): ICD-10-CM

## 2024-12-19 DIAGNOSIS — T45.1X5A CHEMOTHERAPY-INDUCED PERIPHERAL NEUROPATHY (MULTI): ICD-10-CM

## 2024-12-19 DIAGNOSIS — M96.1 FAILED BACK SYNDROME OF CERVICAL SPINE: Primary | ICD-10-CM

## 2024-12-19 PROCEDURE — 1160F RVW MEDS BY RX/DR IN RCRD: CPT | Performed by: PHYSICIAN ASSISTANT

## 2024-12-19 PROCEDURE — 99214 OFFICE O/P EST MOD 30 MIN: CPT | Performed by: PHYSICIAN ASSISTANT

## 2024-12-19 PROCEDURE — 1036F TOBACCO NON-USER: CPT | Performed by: PHYSICIAN ASSISTANT

## 2024-12-19 PROCEDURE — 1159F MED LIST DOCD IN RCRD: CPT | Performed by: PHYSICIAN ASSISTANT

## 2024-12-19 PROCEDURE — 3075F SYST BP GE 130 - 139MM HG: CPT | Performed by: PHYSICIAN ASSISTANT

## 2024-12-19 PROCEDURE — 1125F AMNT PAIN NOTED PAIN PRSNT: CPT | Performed by: PHYSICIAN ASSISTANT

## 2024-12-19 PROCEDURE — 4010F ACE/ARB THERAPY RXD/TAKEN: CPT | Performed by: PHYSICIAN ASSISTANT

## 2024-12-19 PROCEDURE — 3078F DIAST BP <80 MM HG: CPT | Performed by: PHYSICIAN ASSISTANT

## 2024-12-19 ASSESSMENT — ENCOUNTER SYMPTOMS
NUMBNESS: 1
WEAKNESS: 1
CARDIOVASCULAR NEGATIVE: 1
NECK STIFFNESS: 1
JOINT SWELLING: 1
EYES NEGATIVE: 1
ALLERGIC/IMMUNOLOGIC NEGATIVE: 1
HEMATOLOGIC/LYMPHATIC NEGATIVE: 1
CONSTITUTIONAL NEGATIVE: 1
GASTROINTESTINAL NEGATIVE: 1
PSYCHIATRIC NEGATIVE: 1
LEG PAIN: 1
BACK PAIN: 1
ENDOCRINE NEGATIVE: 1
RESPIRATORY NEGATIVE: 1
MYALGIAS: 1
ARTHRALGIAS: 1
NECK PAIN: 1
PAIN: 1

## 2024-12-19 ASSESSMENT — PATIENT HEALTH QUESTIONNAIRE - PHQ9
SUM OF ALL RESPONSES TO PHQ9 QUESTIONS 1 & 2: 0
2. FEELING DOWN, DEPRESSED OR HOPELESS: NOT AT ALL
1. LITTLE INTEREST OR PLEASURE IN DOING THINGS: NOT AT ALL

## 2024-12-19 ASSESSMENT — PAIN SCALES - GENERAL
PAINLEVEL_OUTOF10: 3
PAINLEVEL_OUTOF10: 3

## 2024-12-19 ASSESSMENT — PAIN - FUNCTIONAL ASSESSMENT: PAIN_FUNCTIONAL_ASSESSMENT: 0-10

## 2024-12-19 NOTE — PROGRESS NOTES
Subjective   Patient ID: Lisa Andrade is a 74 y.o. female who presents for Pain (Right leg).  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 she had breast CA in 2013 and lung Ca in 2021.  She had small amount of neuropathy with the breast cancer the with the lung cancer treatment her neuropathy got much worse.  She reports that her has pain in botht he legs and the arms.  Her legs and feet are the worse.  She has a hard time falling and staying asleep.  She has a pulling sensation in her legs.  She has a numbness up to her knees.  She symptoms are worse in the evening time typically.  Now she has these zapping increases in her pain.  She feels like her hands and feet are twisting and turning on her.  Patient has not been on gabapentin or Lyrica for greater than 72 hours.  Utilize Norco for pain relief.  Generally without the pain medication she is a 10 out of 10 currently she is stating a 3 out of 10    Patient states that the shooting symptoms have began progressively getting worse.  It it was almost a 9 out of 10.  At 9 PM symptoms as when they progressed to be much more amplified.  Woke up at 1:30 AM from sleep with extreme shooting lateral calf to foot symptoms.  At 4 AM this progressed.  Patient did notes that since she has been up it has been slightly less.  Her baseline pain is still at a 3 out of 10 but she still continues to have the shooting symptoms.  Has associated concurrent mid back and neck pain last course of physical therapy was greater than 2 years ago and it was ineffective    Pain  Associated symptoms include joint swelling and weakness.   Leg Pain   Associated symptoms include numbness.       Review of Systems   Constitutional: Negative.    HENT: Negative.     Eyes: Negative.    Respiratory: Negative.     Cardiovascular: Negative.    Gastrointestinal: Negative.    Endocrine: Negative.    Genitourinary: Negative.    Musculoskeletal:  Positive for arthralgias, back pain, gait  problem, joint swelling, myalgias, neck pain and neck stiffness.   Skin: Negative.    Allergic/Immunologic: Negative.    Neurological:  Positive for weakness and numbness.   Hematological: Negative.    Psychiatric/Behavioral: Negative.         Objective   Physical Exam  Musculoskeletal:      Comments: Stocking glove distribution below knee.  Decreased range of motion in neck and mid back region       Assessment/Plan   Problem List Items Addressed This Visit             ICD-10-CM    Failed back syndrome of cervical spine - Primary M96.1    Relevant Orders    Referral to Physical Therapy    Chemotherapy-induced peripheral neuropathy (Multi) G62.0, T45.1X5A    Relevant Orders    Referral to Physical Therapy     Other Visit Diagnoses         Codes    Mid back pain     M54.9    Relevant Orders    Referral to Physical Therapy          I think I will have patient start physical therapy and then I will have her follow-up with Dr. Hyatt.  I do think that potentially some additional imaging may be required and beneficial.  There also may be need for an EMG but we will start with conservative treatments at this time.        Jamal Rinaldi PA-C 12/19/24 9:16 AM

## 2024-12-20 ENCOUNTER — APPOINTMENT (OUTPATIENT)
Dept: PAIN MEDICINE | Facility: CLINIC | Age: 74
End: 2024-12-20
Payer: MEDICARE

## 2024-12-21 ENCOUNTER — TELEPHONE (OUTPATIENT)
Dept: HEMATOLOGY/ONCOLOGY | Facility: CLINIC | Age: 74
End: 2024-12-21
Payer: MEDICARE

## 2024-12-23 ENCOUNTER — APPOINTMENT (OUTPATIENT)
Dept: PAIN MEDICINE | Facility: CLINIC | Age: 74
End: 2024-12-23
Payer: MEDICARE

## 2024-12-24 ENCOUNTER — APPOINTMENT (OUTPATIENT)
Dept: PAIN MEDICINE | Facility: CLINIC | Age: 74
End: 2024-12-24
Payer: MEDICARE

## 2024-12-26 ENCOUNTER — APPOINTMENT (OUTPATIENT)
Dept: PAIN MEDICINE | Facility: CLINIC | Age: 74
End: 2024-12-26
Payer: MEDICARE

## 2024-12-27 ENCOUNTER — APPOINTMENT (OUTPATIENT)
Dept: PAIN MEDICINE | Facility: CLINIC | Age: 74
End: 2024-12-27
Payer: MEDICARE

## 2024-12-30 ENCOUNTER — APPOINTMENT (OUTPATIENT)
Dept: PAIN MEDICINE | Facility: CLINIC | Age: 74
End: 2024-12-30
Payer: MEDICARE

## 2025-02-06 ENCOUNTER — APPOINTMENT (OUTPATIENT)
Dept: PAIN MEDICINE | Facility: CLINIC | Age: 75
End: 2025-02-06
Payer: MEDICARE

## 2025-03-02 ENCOUNTER — HOSPITAL ENCOUNTER (EMERGENCY)
Facility: HOSPITAL | Age: 75
Discharge: HOME | End: 2025-03-02
Attending: EMERGENCY MEDICINE
Payer: MEDICARE

## 2025-03-02 ENCOUNTER — APPOINTMENT (OUTPATIENT)
Dept: RADIOLOGY | Facility: HOSPITAL | Age: 75
End: 2025-03-02
Payer: MEDICARE

## 2025-03-02 ENCOUNTER — APPOINTMENT (OUTPATIENT)
Dept: CARDIOLOGY | Facility: HOSPITAL | Age: 75
End: 2025-03-02
Payer: MEDICARE

## 2025-03-02 VITALS
HEIGHT: 64 IN | SYSTOLIC BLOOD PRESSURE: 152 MMHG | RESPIRATION RATE: 16 BRPM | BODY MASS INDEX: 29.02 KG/M2 | HEART RATE: 76 BPM | DIASTOLIC BLOOD PRESSURE: 60 MMHG | TEMPERATURE: 97.2 F | WEIGHT: 170 LBS | OXYGEN SATURATION: 98 %

## 2025-03-02 DIAGNOSIS — R42 VERTIGO: Primary | ICD-10-CM

## 2025-03-02 DIAGNOSIS — E83.42 HYPOMAGNESEMIA: ICD-10-CM

## 2025-03-02 LAB
ALBUMIN SERPL BCP-MCNC: 4.5 G/DL (ref 3.4–5)
ALP SERPL-CCNC: 68 U/L (ref 33–136)
ALT SERPL W P-5'-P-CCNC: 17 U/L (ref 7–45)
ANION GAP SERPL CALC-SCNC: 18 MMOL/L (ref 10–20)
AST SERPL W P-5'-P-CCNC: 27 U/L (ref 9–39)
BASOPHILS # BLD AUTO: 0.03 X10*3/UL (ref 0–0.1)
BASOPHILS NFR BLD AUTO: 0.5 %
BILIRUB SERPL-MCNC: 0.3 MG/DL (ref 0–1.2)
BUN SERPL-MCNC: 21 MG/DL (ref 6–23)
CALCIUM SERPL-MCNC: 7.7 MG/DL (ref 8.6–10.3)
CARDIAC TROPONIN I PNL SERPL HS: 9 NG/L (ref 0–13)
CARDIAC TROPONIN I PNL SERPL HS: 9 NG/L (ref 0–13)
CHLORIDE SERPL-SCNC: 100 MMOL/L (ref 98–107)
CO2 SERPL-SCNC: 22 MMOL/L (ref 21–32)
CREAT SERPL-MCNC: 1.17 MG/DL (ref 0.5–1.05)
EGFRCR SERPLBLD CKD-EPI 2021: 49 ML/MIN/1.73M*2
EOSINOPHIL # BLD AUTO: 0.1 X10*3/UL (ref 0–0.4)
EOSINOPHIL NFR BLD AUTO: 1.5 %
ERYTHROCYTE [DISTWIDTH] IN BLOOD BY AUTOMATED COUNT: 14.1 % (ref 11.5–14.5)
FLUAV RNA RESP QL NAA+PROBE: NOT DETECTED
FLUBV RNA RESP QL NAA+PROBE: NOT DETECTED
GLUCOSE SERPL-MCNC: 136 MG/DL (ref 74–99)
HCT VFR BLD AUTO: 30.3 % (ref 36–46)
HGB BLD-MCNC: 9.7 G/DL (ref 12–16)
IMM GRANULOCYTES # BLD AUTO: 0.02 X10*3/UL (ref 0–0.5)
IMM GRANULOCYTES NFR BLD AUTO: 0.3 % (ref 0–0.9)
LYMPHOCYTES # BLD AUTO: 2.13 X10*3/UL (ref 0.8–3)
LYMPHOCYTES NFR BLD AUTO: 32.6 %
MAGNESIUM SERPL-MCNC: 1.18 MG/DL (ref 1.6–2.4)
MCH RBC QN AUTO: 29.6 PG (ref 26–34)
MCHC RBC AUTO-ENTMCNC: 32 G/DL (ref 32–36)
MCV RBC AUTO: 92 FL (ref 80–100)
MONOCYTES # BLD AUTO: 0.64 X10*3/UL (ref 0.05–0.8)
MONOCYTES NFR BLD AUTO: 9.8 %
NEUTROPHILS # BLD AUTO: 3.61 X10*3/UL (ref 1.6–5.5)
NEUTROPHILS NFR BLD AUTO: 55.3 %
NRBC BLD-RTO: 0 /100 WBCS (ref 0–0)
PLATELET # BLD AUTO: 239 X10*3/UL (ref 150–450)
POTASSIUM SERPL-SCNC: 4.1 MMOL/L (ref 3.5–5.3)
PROT SERPL-MCNC: 7.2 G/DL (ref 6.4–8.2)
RBC # BLD AUTO: 3.28 X10*6/UL (ref 4–5.2)
SARS-COV-2 RNA RESP QL NAA+PROBE: NOT DETECTED
SODIUM SERPL-SCNC: 136 MMOL/L (ref 136–145)
TSH SERPL-ACNC: 2.53 MIU/L (ref 0.44–3.98)
WBC # BLD AUTO: 6.5 X10*3/UL (ref 4.4–11.3)

## 2025-03-02 PROCEDURE — 36415 COLL VENOUS BLD VENIPUNCTURE: CPT

## 2025-03-02 PROCEDURE — 85025 COMPLETE CBC W/AUTO DIFF WBC: CPT

## 2025-03-02 PROCEDURE — 87636 SARSCOV2 & INF A&B AMP PRB: CPT

## 2025-03-02 PROCEDURE — 99284 EMERGENCY DEPT VISIT MOD MDM: CPT | Mod: 25 | Performed by: EMERGENCY MEDICINE

## 2025-03-02 PROCEDURE — 84443 ASSAY THYROID STIM HORMONE: CPT

## 2025-03-02 PROCEDURE — 84075 ASSAY ALKALINE PHOSPHATASE: CPT

## 2025-03-02 PROCEDURE — 96365 THER/PROPH/DIAG IV INF INIT: CPT

## 2025-03-02 PROCEDURE — 96366 THER/PROPH/DIAG IV INF ADDON: CPT

## 2025-03-02 PROCEDURE — 84484 ASSAY OF TROPONIN QUANT: CPT

## 2025-03-02 PROCEDURE — 83735 ASSAY OF MAGNESIUM: CPT

## 2025-03-02 PROCEDURE — 2500000004 HC RX 250 GENERAL PHARMACY W/ HCPCS (ALT 636 FOR OP/ED)

## 2025-03-02 PROCEDURE — 2500000002 HC RX 250 W HCPCS SELF ADMINISTERED DRUGS (ALT 637 FOR MEDICARE OP, ALT 636 FOR OP/ED)

## 2025-03-02 PROCEDURE — 99285 EMERGENCY DEPT VISIT HI MDM: CPT | Performed by: EMERGENCY MEDICINE

## 2025-03-02 PROCEDURE — 93005 ELECTROCARDIOGRAM TRACING: CPT

## 2025-03-02 PROCEDURE — 96375 TX/PRO/DX INJ NEW DRUG ADDON: CPT

## 2025-03-02 RX ORDER — MECLIZINE HYDROCHLORIDE 25 MG/1
25 TABLET ORAL ONCE
Status: COMPLETED | OUTPATIENT
Start: 2025-03-02 | End: 2025-03-02

## 2025-03-02 RX ORDER — MECLIZINE HYDROCHLORIDE 25 MG/1
25 TABLET ORAL 3 TIMES DAILY PRN
Qty: 20 TABLET | Refills: 0 | Status: SHIPPED | OUTPATIENT
Start: 2025-03-02 | End: 2025-03-02

## 2025-03-02 RX ORDER — CALCIUM CARBONATE 300MG(750)
400 TABLET,CHEWABLE ORAL 2 TIMES DAILY
Qty: 28 TABLET | Refills: 0 | Status: SHIPPED | OUTPATIENT
Start: 2025-03-02 | End: 2025-03-02

## 2025-03-02 RX ORDER — MAGNESIUM SULFATE HEPTAHYDRATE 40 MG/ML
2 INJECTION, SOLUTION INTRAVENOUS ONCE
Status: COMPLETED | OUTPATIENT
Start: 2025-03-02 | End: 2025-03-02

## 2025-03-02 RX ORDER — LANOLIN ALCOHOL/MO/W.PET/CERES
800 CREAM (GRAM) TOPICAL ONCE
Status: COMPLETED | OUTPATIENT
Start: 2025-03-02 | End: 2025-03-02

## 2025-03-02 RX ORDER — CALCIUM CARBONATE 300MG(750)
400 TABLET,CHEWABLE ORAL 2 TIMES DAILY
Qty: 28 TABLET | Refills: 0 | Status: SHIPPED | OUTPATIENT
Start: 2025-03-02 | End: 2025-03-16

## 2025-03-02 RX ORDER — MECLIZINE HYDROCHLORIDE 25 MG/1
25 TABLET ORAL 3 TIMES DAILY PRN
Qty: 20 TABLET | Refills: 0 | Status: SHIPPED | OUTPATIENT
Start: 2025-03-02 | End: 2025-03-12

## 2025-03-02 RX ORDER — ONDANSETRON HYDROCHLORIDE 2 MG/ML
4 INJECTION, SOLUTION INTRAVENOUS ONCE
Status: COMPLETED | OUTPATIENT
Start: 2025-03-02 | End: 2025-03-02

## 2025-03-02 RX ADMIN — MECLIZINE HYDROCHLORIDE 25 MG: 25 TABLET ORAL at 06:07

## 2025-03-02 RX ADMIN — Medication 800 MG: at 04:24

## 2025-03-02 RX ADMIN — MAGNESIUM SULFATE HEPTAHYDRATE 2 G: 40 INJECTION, SOLUTION INTRAVENOUS at 04:24

## 2025-03-02 RX ADMIN — ONDANSETRON 4 MG: 2 INJECTION INTRAMUSCULAR; INTRAVENOUS at 04:24

## 2025-03-02 ASSESSMENT — PAIN SCALES - GENERAL: PAINLEVEL_OUTOF10: 2

## 2025-03-02 ASSESSMENT — COLUMBIA-SUICIDE SEVERITY RATING SCALE - C-SSRS
6. HAVE YOU EVER DONE ANYTHING, STARTED TO DO ANYTHING, OR PREPARED TO DO ANYTHING TO END YOUR LIFE?: NO
1. IN THE PAST MONTH, HAVE YOU WISHED YOU WERE DEAD OR WISHED YOU COULD GO TO SLEEP AND NOT WAKE UP?: NO
2. HAVE YOU ACTUALLY HAD ANY THOUGHTS OF KILLING YOURSELF?: NO

## 2025-03-02 ASSESSMENT — PAIN DESCRIPTION - ORIENTATION: ORIENTATION: LEFT;UPPER

## 2025-03-02 ASSESSMENT — PAIN DESCRIPTION - LOCATION: LOCATION: BACK

## 2025-03-02 ASSESSMENT — PAIN - FUNCTIONAL ASSESSMENT: PAIN_FUNCTIONAL_ASSESSMENT: 0-10

## 2025-03-02 NOTE — ED PROVIDER NOTES
Emergency Department Provider Note        History of Present Illness     History provided by: Patient and Family Member  Limitations to History: None  External Records Reviewed with Brief Summary: None    HPI:  Lisa Andrade is a 74 y.o. female with hypertension, hyperlipidemia, diabetes, lung cancer s/p chemotherapy and radiation, breast cancer s/p chemotherapy and radiation now on oral therapy presenting for dizziness.  Symptoms started on Friday.  She presented to [facility] where they ordered a CT scan of her head, obtain lab work, and rehydrated her.  She had low magnesium level so they gave her magnesium there and discharged home on magnesium.  She has been taking her magnesium supplements as prescribed.  Her symptoms returned tonight and she felt dizzy again and had palpitations.  She is concerned her magnesium levels may still be low.  She describes the dizziness as a spinning sensation.  It worsens when she moves her head.  It has slowly improved over time and she has only minimal dizziness now.  Denies any weakness or numbness.  She is here with her daughters who have not noticed any slurred speech, facial droop, confusion.    Physical Exam   Triage vitals:  T 36.2 °C (97.2 °F)  HR 71  /70  RR 20  O2 96 % None (Room air)    Physical Exam  Vitals and nursing note reviewed.   Constitutional:       General: She is not in acute distress.     Appearance: She is well-developed.   HENT:      Head: Normocephalic and atraumatic.      Right Ear: External ear normal.      Left Ear: External ear normal.      Nose: Nose normal.      Mouth/Throat:      Mouth: Mucous membranes are moist.   Eyes:      General: No scleral icterus.     Extraocular Movements: Extraocular movements intact.      Conjunctiva/sclera: Conjunctivae normal.      Pupils: Pupils are equal, round, and reactive to light.      Comments: No nystagmus.   Cardiovascular:      Rate and Rhythm: Normal rate and regular rhythm.      Heart sounds:  No murmur heard.  Pulmonary:      Effort: Pulmonary effort is normal. No respiratory distress.      Breath sounds: Normal breath sounds.   Abdominal:      Palpations: Abdomen is soft.      Tenderness: There is no abdominal tenderness.   Musculoskeletal:         General: No swelling.      Cervical back: Neck supple.   Skin:     General: Skin is warm and dry.   Neurological:      General: No focal deficit present.      Mental Status: She is alert and oriented to person, place, and time.      Cranial Nerves: No cranial nerve deficit.      Sensory: No sensory deficit.      Motor: No weakness.      Coordination: Coordination normal.   Psychiatric:         Mood and Affect: Mood normal.          Medical Decision Making & ED Course   Medical Decision Makin y.o. female presenting for dizziness, palpitations.  She is afebrile hemodynamic stable on arrival.  She reported her dizziness as vertigo but has slowly improved.  No focal neurological deficits, no nystagmus.  She has similar symptoms on Friday when she went to [facility].  Unable to see the results there, but she reported that she had low magnesium at that time and a CT head.  They discharged her on magnesium.  Will obtain evaluation for cardiac, metabolic, electrolyte abnormalities.  We reached out to [facility] to have the images pushed over as well as spoke to the radiology resident there.  He was able to see the images and report and reported that it was unremarkable.  Thus, will not repeat CT head.  Patient given meclizine, Zofran, IV fluids.    CBC negative for leukocytosis.  Hemoglobin 9.77 prior lab work.  Chemistry panel shows stable CKD.  COVID, influenza negative.  Troponin negative x 2.  TSH within normal limits.  Magnesium is decreased at 1.18 and she is given IV and oral magnesium.    Upon evaluation, her symptoms have resolved.  She instructed to increase her oral magnesium at home and given a prescription for additional magnesium.  Home care and return  instructions discussed. Patient expressed understanding and agreement. Patient discharged in stable condition.    Erik Trujillo DO, PGY-4  Emergency Medicine Resident  ----       Social Determinants of Health which Significantly Impact Care: None identified     EKG Independent Interpretation:   Sinus rhythm with a ventricular rate of 69.  Occasional PVC.  .  QTc 435.  No ST changes.    Independent Result Review and Interpretation: Relevant laboratory and radiographic results were reviewed and independently interpreted by myself.  As necessary, they are commented on in the ED Course.    Chronic conditions affecting the patient's care: As documented above in MDM    The patient was discussed with the following consultants/services: None    Care Considerations: As documented above in [facility]    ED Course:  Diagnoses as of 03/03/25 0211   Vertigo   Hypomagnesemia     Disposition   As a result of the work-up, the patient was discharged home.  she was informed of her diagnosis and instructed to come back with any concerns or worsening of condition.  she and was agreeable to the plan as discussed above.  she was given the opportunity to ask questions.  All of the patient's questions were answered.    Procedures   Procedures    Patient seen and discussed with ED attending physician.    Erik Trujillo DO  Emergency Medicine    =================Attending note===============    The patient was seen by the resident/fellow.  I have personally performed a substantive portion of the encounter.  I have seen and examined the patient; agree with the workup, evaluation, MDM,   management and diagnosis.  The care plan has been discussed with the resident; I have reviewed the resident’s note and agree with the documented findings.      This is a 74 y.o. female who presents to ER with myalgias and vertigo that started on Friday.  She was seen at [facility] and had CT imaging done.  States she was in bed and she woke up from  sleep and she was having vertigo again around 2 AM.  It did resolve by the time she got here.  States it typically last for a few hours when she gets it.  Is been intermittent she has been asymptomatic between episodes.  Some nausea vomiting.  No chest pain or shortness of breath.  She denies any vertigo prior to Friday.  She denies any recent falls or head injuries.  She does have a history of lung and breast cancer and both are in remission.  She does have hypertension hyperlipidemia and diabetes and neuropathy and COPD.    She is currently awake and alert with minimal leftward nystagmus.  Normal tympanic membranes.  No carotid bruit.  No pronator drift.  No ataxia with finger-to-nose or heel-to-shin.  No drift in her lower extremities.  No neurologic deficits.  Heart regular.  Lungs clear.  Abdomen soft and nontender.  She is asymptomatic at the time of my exam.    She has not evaluated as a brain attack since she is been having the symptoms since Friday.  The symptoms of also been intermittent which makes vertebrobasilar insufficiency extremely unlikely and makes this most consistent with benign positional vertigo.    We cannot see here results of her workup from [facility] on Friday.  We were able to call them and I did discuss the case with the radiology resident at [facility], Dr. Lutz.  He states that there is no acute intracranial abnormalities.  There is some mild small vessel disease.  This is from CT imaging done on 2/28/2025.    Since we were able to confirm an unremarkable CT from Friday we did not repeat one today.  She states when she was at [facility] that she had low magnesium and they replaced it intravenously and she has been on it orally since then.    Patient does have some renal insufficiency.  Her magnesium is low at 1.18.  She is given IV and oral magnesium here.  There is no leukocytosis.  There are some mild anemia.  Anemia is at baseline.  COVID and flu are negative.  Patient was  given a dose of meclizine to prevent recurrence of her symptoms.    There is no further symptoms in the ER.    Patient is discharged home.  She is to follow-up with neurology and ENT.  She is given prescription for meclizine.  We also increased her magnesium oxide to 400 mg twice a day.  She is to return to the nearest ER for any new or worsening symptoms.  She is satisfied with this plan.            ==========================================       Rafael Paz DO  03/04/25 0918

## 2025-03-02 NOTE — DISCHARGE INSTRUCTIONS
Seek immediate medical attention if you develop: worsening dizziness, headache, nausea, vomiting, confusion, weakness, loss of motion in your arms or legs, loss of control of your urine or stool, difficulty waking from sleep, neck pain, fever, or any new or worsening symptoms.

## 2025-03-02 NOTE — ED TRIAGE NOTES
Pt woke at 0200 with lightheadedness. Pt states she felt like this on Friday and went to [facility] ED where she was told her magnesium was critically low. Pt was sent home with magnesium which she states she took as directed. Pt also with nausea with no vomiting. States her body just doesn't feel right.

## 2025-03-04 LAB
ATRIAL RATE: 69 BPM
P AXIS: 48 DEGREES
P OFFSET: 199 MS
P ONSET: 150 MS
PR INTERVAL: 148 MS
Q ONSET: 224 MS
QRS COUNT: 11 BEATS
QRS DURATION: 76 MS
QT INTERVAL: 406 MS
QTC CALCULATION(BAZETT): 435 MS
QTC FREDERICIA: 425 MS
R AXIS: -7 DEGREES
T AXIS: 31 DEGREES
T OFFSET: 427 MS
VENTRICULAR RATE: 69 BPM

## 2025-03-07 ENCOUNTER — APPOINTMENT (OUTPATIENT)
Dept: RADIOLOGY | Facility: HOSPITAL | Age: 75
End: 2025-03-07
Payer: MEDICARE

## 2025-03-07 ENCOUNTER — APPOINTMENT (OUTPATIENT)
Dept: CARDIOLOGY | Facility: HOSPITAL | Age: 75
End: 2025-03-07
Payer: MEDICARE

## 2025-03-07 ENCOUNTER — HOSPITAL ENCOUNTER (OUTPATIENT)
Facility: HOSPITAL | Age: 75
Setting detail: OBSERVATION
Discharge: HOME | End: 2025-03-07
Attending: EMERGENCY MEDICINE | Admitting: INTERNAL MEDICINE
Payer: MEDICARE

## 2025-03-07 VITALS
HEIGHT: 63 IN | HEART RATE: 69 BPM | DIASTOLIC BLOOD PRESSURE: 46 MMHG | SYSTOLIC BLOOD PRESSURE: 93 MMHG | WEIGHT: 190.26 LBS | TEMPERATURE: 97.2 F | RESPIRATION RATE: 18 BRPM | OXYGEN SATURATION: 93 % | BODY MASS INDEX: 33.71 KG/M2

## 2025-03-07 DIAGNOSIS — R42 DIZZINESS: ICD-10-CM

## 2025-03-07 DIAGNOSIS — R09.81 NASAL SINUS CONGESTION: ICD-10-CM

## 2025-03-07 DIAGNOSIS — R42 DIZZINESSES: ICD-10-CM

## 2025-03-07 DIAGNOSIS — R42 VERTIGO: ICD-10-CM

## 2025-03-07 DIAGNOSIS — E83.42 HYPOMAGNESEMIA: Primary | ICD-10-CM

## 2025-03-07 LAB
ALBUMIN SERPL BCP-MCNC: 4.3 G/DL (ref 3.4–5)
ALP SERPL-CCNC: 85 U/L (ref 33–136)
ALT SERPL W P-5'-P-CCNC: 17 U/L (ref 7–45)
ANION GAP SERPL CALC-SCNC: 14 MMOL/L (ref 10–20)
ANION GAP SERPL CALC-SCNC: 15 MMOL/L (ref 10–20)
AST SERPL W P-5'-P-CCNC: 28 U/L (ref 9–39)
BASOPHILS # BLD AUTO: 0.03 X10*3/UL (ref 0–0.1)
BASOPHILS NFR BLD AUTO: 0.5 %
BILIRUB SERPL-MCNC: 0.2 MG/DL (ref 0–1.2)
BUN SERPL-MCNC: 18 MG/DL (ref 6–23)
BUN SERPL-MCNC: 18 MG/DL (ref 6–23)
CALCIUM SERPL-MCNC: 8.7 MG/DL (ref 8.6–10.3)
CALCIUM SERPL-MCNC: 9.1 MG/DL (ref 8.6–10.3)
CARDIAC TROPONIN I PNL SERPL HS: 7 NG/L (ref 0–13)
CARDIAC TROPONIN I PNL SERPL HS: 8 NG/L (ref 0–13)
CHLORIDE SERPL-SCNC: 100 MMOL/L (ref 98–107)
CHLORIDE SERPL-SCNC: 102 MMOL/L (ref 98–107)
CO2 SERPL-SCNC: 24 MMOL/L (ref 21–32)
CO2 SERPL-SCNC: 25 MMOL/L (ref 21–32)
CREAT SERPL-MCNC: 1.01 MG/DL (ref 0.5–1.05)
CREAT SERPL-MCNC: 1.09 MG/DL (ref 0.5–1.05)
EGFRCR SERPLBLD CKD-EPI 2021: 53 ML/MIN/1.73M*2
EGFRCR SERPLBLD CKD-EPI 2021: 58 ML/MIN/1.73M*2
EOSINOPHIL # BLD AUTO: 0.15 X10*3/UL (ref 0–0.4)
EOSINOPHIL NFR BLD AUTO: 2.5 %
ERYTHROCYTE [DISTWIDTH] IN BLOOD BY AUTOMATED COUNT: 13.8 % (ref 11.5–14.5)
ERYTHROCYTE [DISTWIDTH] IN BLOOD BY AUTOMATED COUNT: 13.8 % (ref 11.5–14.5)
FLUAV RNA RESP QL NAA+PROBE: NOT DETECTED
FLUBV RNA RESP QL NAA+PROBE: NOT DETECTED
GLUCOSE BLD MANUAL STRIP-MCNC: 139 MG/DL (ref 74–99)
GLUCOSE BLD MANUAL STRIP-MCNC: 140 MG/DL (ref 74–99)
GLUCOSE SERPL-MCNC: 125 MG/DL (ref 74–99)
GLUCOSE SERPL-MCNC: 134 MG/DL (ref 74–99)
HCT VFR BLD AUTO: 29.7 % (ref 36–46)
HCT VFR BLD AUTO: 30.4 % (ref 36–46)
HGB BLD-MCNC: 9 G/DL (ref 12–16)
HGB BLD-MCNC: 9.4 G/DL (ref 12–16)
IMM GRANULOCYTES # BLD AUTO: 0.01 X10*3/UL (ref 0–0.5)
IMM GRANULOCYTES NFR BLD AUTO: 0.2 % (ref 0–0.9)
LYMPHOCYTES # BLD AUTO: 2.01 X10*3/UL (ref 0.8–3)
LYMPHOCYTES NFR BLD AUTO: 33.8 %
MAGNESIUM SERPL-MCNC: 1.48 MG/DL (ref 1.6–2.4)
MAGNESIUM SERPL-MCNC: 2.22 MG/DL (ref 1.6–2.4)
MCH RBC QN AUTO: 29.5 PG (ref 26–34)
MCH RBC QN AUTO: 29.7 PG (ref 26–34)
MCHC RBC AUTO-ENTMCNC: 30.3 G/DL (ref 32–36)
MCHC RBC AUTO-ENTMCNC: 30.9 G/DL (ref 32–36)
MCV RBC AUTO: 95 FL (ref 80–100)
MCV RBC AUTO: 98 FL (ref 80–100)
MONOCYTES # BLD AUTO: 0.74 X10*3/UL (ref 0.05–0.8)
MONOCYTES NFR BLD AUTO: 12.4 %
NEUTROPHILS # BLD AUTO: 3.01 X10*3/UL (ref 1.6–5.5)
NEUTROPHILS NFR BLD AUTO: 50.6 %
NRBC BLD-RTO: 0 /100 WBCS (ref 0–0)
NRBC BLD-RTO: 0 /100 WBCS (ref 0–0)
PHOSPHATE SERPL-MCNC: 3.7 MG/DL (ref 2.5–4.9)
PLATELET # BLD AUTO: 186 X10*3/UL (ref 150–450)
PLATELET # BLD AUTO: 213 X10*3/UL (ref 150–450)
POTASSIUM SERPL-SCNC: 4.6 MMOL/L (ref 3.5–5.3)
POTASSIUM SERPL-SCNC: 4.7 MMOL/L (ref 3.5–5.3)
PROT SERPL-MCNC: 6.8 G/DL (ref 6.4–8.2)
RBC # BLD AUTO: 3.03 X10*6/UL (ref 4–5.2)
RBC # BLD AUTO: 3.19 X10*6/UL (ref 4–5.2)
SARS-COV-2 RNA RESP QL NAA+PROBE: NOT DETECTED
SODIUM SERPL-SCNC: 134 MMOL/L (ref 136–145)
SODIUM SERPL-SCNC: 136 MMOL/L (ref 136–145)
WBC # BLD AUTO: 4.7 X10*3/UL (ref 4.4–11.3)
WBC # BLD AUTO: 6 X10*3/UL (ref 4.4–11.3)

## 2025-03-07 PROCEDURE — 83735 ASSAY OF MAGNESIUM: CPT | Performed by: EMERGENCY MEDICINE

## 2025-03-07 PROCEDURE — 85025 COMPLETE CBC W/AUTO DIFF WBC: CPT | Performed by: EMERGENCY MEDICINE

## 2025-03-07 PROCEDURE — 71045 X-RAY EXAM CHEST 1 VIEW: CPT

## 2025-03-07 PROCEDURE — 93005 ELECTROCARDIOGRAM TRACING: CPT

## 2025-03-07 PROCEDURE — 85027 COMPLETE CBC AUTOMATED: CPT

## 2025-03-07 PROCEDURE — 2500000004 HC RX 250 GENERAL PHARMACY W/ HCPCS (ALT 636 FOR OP/ED): Performed by: NURSE PRACTITIONER

## 2025-03-07 PROCEDURE — 99285 EMERGENCY DEPT VISIT HI MDM: CPT | Mod: 25 | Performed by: EMERGENCY MEDICINE

## 2025-03-07 PROCEDURE — 80048 BASIC METABOLIC PNL TOTAL CA: CPT | Mod: CCI

## 2025-03-07 PROCEDURE — 94640 AIRWAY INHALATION TREATMENT: CPT

## 2025-03-07 PROCEDURE — 36415 COLL VENOUS BLD VENIPUNCTURE: CPT | Performed by: EMERGENCY MEDICINE

## 2025-03-07 PROCEDURE — 96372 THER/PROPH/DIAG INJ SC/IM: CPT

## 2025-03-07 PROCEDURE — 99285 EMERGENCY DEPT VISIT HI MDM: CPT | Performed by: EMERGENCY MEDICINE

## 2025-03-07 PROCEDURE — 2500000002 HC RX 250 W HCPCS SELF ADMINISTERED DRUGS (ALT 637 FOR MEDICARE OP, ALT 636 FOR OP/ED): Performed by: NURSE PRACTITIONER

## 2025-03-07 PROCEDURE — 70450 CT HEAD/BRAIN W/O DYE: CPT | Performed by: RADIOLOGY

## 2025-03-07 PROCEDURE — 2500000004 HC RX 250 GENERAL PHARMACY W/ HCPCS (ALT 636 FOR OP/ED)

## 2025-03-07 PROCEDURE — 2500000002 HC RX 250 W HCPCS SELF ADMINISTERED DRUGS (ALT 637 FOR MEDICARE OP, ALT 636 FOR OP/ED): Performed by: ADVANCED PRACTICE MIDWIFE

## 2025-03-07 PROCEDURE — 80053 COMPREHEN METABOLIC PANEL: CPT | Performed by: EMERGENCY MEDICINE

## 2025-03-07 PROCEDURE — G0378 HOSPITAL OBSERVATION PER HR: HCPCS

## 2025-03-07 PROCEDURE — 2500000001 HC RX 250 WO HCPCS SELF ADMINISTERED DRUGS (ALT 637 FOR MEDICARE OP): Performed by: NURSE PRACTITIONER

## 2025-03-07 PROCEDURE — 97161 PT EVAL LOW COMPLEX 20 MIN: CPT | Mod: GP

## 2025-03-07 PROCEDURE — 83735 ASSAY OF MAGNESIUM: CPT

## 2025-03-07 PROCEDURE — 99221 1ST HOSP IP/OBS SF/LOW 40: CPT

## 2025-03-07 PROCEDURE — 2500000001 HC RX 250 WO HCPCS SELF ADMINISTERED DRUGS (ALT 637 FOR MEDICARE OP)

## 2025-03-07 PROCEDURE — 84100 ASSAY OF PHOSPHORUS: CPT

## 2025-03-07 PROCEDURE — 71045 X-RAY EXAM CHEST 1 VIEW: CPT | Mod: FOREIGN READ | Performed by: RADIOLOGY

## 2025-03-07 PROCEDURE — 70450 CT HEAD/BRAIN W/O DYE: CPT

## 2025-03-07 PROCEDURE — 2500000002 HC RX 250 W HCPCS SELF ADMINISTERED DRUGS (ALT 637 FOR MEDICARE OP, ALT 636 FOR OP/ED)

## 2025-03-07 PROCEDURE — 84484 ASSAY OF TROPONIN QUANT: CPT | Performed by: EMERGENCY MEDICINE

## 2025-03-07 PROCEDURE — 96375 TX/PRO/DX INJ NEW DRUG ADDON: CPT

## 2025-03-07 PROCEDURE — 96365 THER/PROPH/DIAG IV INF INIT: CPT

## 2025-03-07 PROCEDURE — 82947 ASSAY GLUCOSE BLOOD QUANT: CPT

## 2025-03-07 PROCEDURE — 87636 SARSCOV2 & INF A&B AMP PRB: CPT | Performed by: EMERGENCY MEDICINE

## 2025-03-07 RX ORDER — HYDROCHLOROTHIAZIDE 25 MG/1
25 TABLET ORAL
Status: DISCONTINUED | OUTPATIENT
Start: 2025-03-07 | End: 2025-03-07 | Stop reason: HOSPADM

## 2025-03-07 RX ORDER — CETIRIZINE HYDROCHLORIDE 10 MG/1
10 TABLET ORAL DAILY
Qty: 30 TABLET | Refills: 0 | Status: SHIPPED | OUTPATIENT
Start: 2025-03-08 | End: 2025-04-07

## 2025-03-07 RX ORDER — AMLODIPINE BESYLATE 5 MG/1
5 TABLET ORAL DAILY
Status: DISCONTINUED | OUTPATIENT
Start: 2025-03-07 | End: 2025-03-07 | Stop reason: HOSPADM

## 2025-03-07 RX ORDER — WATER
500 LIQUID (ML) MISCELLANEOUS EVERY 6 HOURS SCHEDULED
Status: DISCONTINUED | OUTPATIENT
Start: 2025-03-07 | End: 2025-03-07 | Stop reason: HOSPADM

## 2025-03-07 RX ORDER — PANTOPRAZOLE SODIUM 40 MG/1
40 TABLET, DELAYED RELEASE ORAL
Status: DISCONTINUED | OUTPATIENT
Start: 2025-03-08 | End: 2025-03-07 | Stop reason: HOSPADM

## 2025-03-07 RX ORDER — HYDROCODONE BITARTRATE AND ACETAMINOPHEN 5; 325 MG/1; MG/1
1 TABLET ORAL EVERY 6 HOURS PRN
Status: DISCONTINUED | OUTPATIENT
Start: 2025-03-07 | End: 2025-03-07 | Stop reason: HOSPADM

## 2025-03-07 RX ORDER — ONDANSETRON HYDROCHLORIDE 2 MG/ML
4 INJECTION, SOLUTION INTRAVENOUS ONCE
Status: COMPLETED | OUTPATIENT
Start: 2025-03-07 | End: 2025-03-07

## 2025-03-07 RX ORDER — MAGNESIUM SULFATE HEPTAHYDRATE 40 MG/ML
2 INJECTION, SOLUTION INTRAVENOUS ONCE
Status: COMPLETED | OUTPATIENT
Start: 2025-03-07 | End: 2025-03-07

## 2025-03-07 RX ORDER — POLYETHYLENE GLYCOL 3350 17 G/17G
17 POWDER, FOR SOLUTION ORAL DAILY PRN
Status: DISCONTINUED | OUTPATIENT
Start: 2025-03-07 | End: 2025-03-07 | Stop reason: HOSPADM

## 2025-03-07 RX ORDER — METOPROLOL TARTRATE 50 MG/1
50 TABLET ORAL 2 TIMES DAILY
Status: DISCONTINUED | OUTPATIENT
Start: 2025-03-07 | End: 2025-03-07 | Stop reason: HOSPADM

## 2025-03-07 RX ORDER — FLUTICASONE PROPIONATE 50 MCG
2 SPRAY, SUSPENSION (ML) NASAL DAILY
Status: DISCONTINUED | OUTPATIENT
Start: 2025-03-07 | End: 2025-03-07 | Stop reason: HOSPADM

## 2025-03-07 RX ORDER — FLUTICASONE PROPIONATE 50 MCG
2 SPRAY, SUSPENSION (ML) NASAL DAILY
Status: DISCONTINUED | OUTPATIENT
Start: 2025-03-07 | End: 2025-03-07

## 2025-03-07 RX ORDER — LANOLIN ALCOHOL/MO/W.PET/CERES
400 CREAM (GRAM) TOPICAL 3 TIMES DAILY
Status: DISCONTINUED | OUTPATIENT
Start: 2025-03-07 | End: 2025-03-07 | Stop reason: HOSPADM

## 2025-03-07 RX ORDER — PRAVASTATIN SODIUM 20 MG/1
80 TABLET ORAL EVERY OTHER DAY
Status: DISCONTINUED | OUTPATIENT
Start: 2025-03-07 | End: 2025-03-07 | Stop reason: HOSPADM

## 2025-03-07 RX ORDER — FLUTICASONE FUROATE AND VILANTEROL 100; 25 UG/1; UG/1
1 POWDER RESPIRATORY (INHALATION)
Status: DISCONTINUED | OUTPATIENT
Start: 2025-03-07 | End: 2025-03-07 | Stop reason: CLARIF

## 2025-03-07 RX ORDER — LOSARTAN POTASSIUM 100 MG/1
100 TABLET ORAL DAILY
Status: DISCONTINUED | OUTPATIENT
Start: 2025-03-07 | End: 2025-03-07 | Stop reason: HOSPADM

## 2025-03-07 RX ORDER — CETIRIZINE HYDROCHLORIDE 10 MG/1
10 TABLET ORAL DAILY
Status: DISCONTINUED | OUTPATIENT
Start: 2025-03-07 | End: 2025-03-07 | Stop reason: HOSPADM

## 2025-03-07 RX ORDER — HEPARIN SODIUM 5000 [USP'U]/ML
5000 INJECTION, SOLUTION INTRAVENOUS; SUBCUTANEOUS EVERY 8 HOURS
Status: DISCONTINUED | OUTPATIENT
Start: 2025-03-07 | End: 2025-03-07 | Stop reason: HOSPADM

## 2025-03-07 RX ORDER — OXYMETAZOLINE HCL 0.05 %
2 SPRAY, NON-AEROSOL (ML) NASAL EVERY 12 HOURS PRN
Status: DISCONTINUED | OUTPATIENT
Start: 2025-03-07 | End: 2025-03-07

## 2025-03-07 RX ORDER — HEPARIN SODIUM 5000 [USP'U]/ML
5000 INJECTION, SOLUTION INTRAVENOUS; SUBCUTANEOUS EVERY 8 HOURS
Status: DISCONTINUED | OUTPATIENT
Start: 2025-03-07 | End: 2025-03-07

## 2025-03-07 RX ORDER — DEXTROSE 50 % IN WATER (D50W) INTRAVENOUS SYRINGE
25
Status: DISCONTINUED | OUTPATIENT
Start: 2025-03-07 | End: 2025-03-07 | Stop reason: HOSPADM

## 2025-03-07 RX ORDER — ISOSORBIDE MONONITRATE 30 MG/1
30 TABLET, EXTENDED RELEASE ORAL DAILY
Status: DISCONTINUED | OUTPATIENT
Start: 2025-03-07 | End: 2025-03-07 | Stop reason: HOSPADM

## 2025-03-07 RX ORDER — ACETAMINOPHEN 160 MG/5ML
650 SOLUTION ORAL EVERY 6 HOURS PRN
Status: DISCONTINUED | OUTPATIENT
Start: 2025-03-07 | End: 2025-03-07 | Stop reason: HOSPADM

## 2025-03-07 RX ORDER — BUDESONIDE 0.25 MG/2ML
0.25 INHALANT ORAL
Status: DISCONTINUED | OUTPATIENT
Start: 2025-03-07 | End: 2025-03-07 | Stop reason: HOSPADM

## 2025-03-07 RX ORDER — FLUTICASONE PROPIONATE 50 MCG
2 SPRAY, SUSPENSION (ML) NASAL DAILY
Qty: 16 G | Refills: 12 | Status: SHIPPED | OUTPATIENT
Start: 2025-03-08

## 2025-03-07 RX ORDER — ONDANSETRON HYDROCHLORIDE 2 MG/ML
4 INJECTION, SOLUTION INTRAVENOUS EVERY 6 HOURS PRN
Status: DISCONTINUED | OUTPATIENT
Start: 2025-03-07 | End: 2025-03-07 | Stop reason: HOSPADM

## 2025-03-07 RX ORDER — SPIRONOLACTONE 25 MG/1
25 TABLET ORAL DAILY
Status: DISCONTINUED | OUTPATIENT
Start: 2025-03-07 | End: 2025-03-07 | Stop reason: HOSPADM

## 2025-03-07 RX ORDER — DEXTROSE 50 % IN WATER (D50W) INTRAVENOUS SYRINGE
12.5
Status: DISCONTINUED | OUTPATIENT
Start: 2025-03-07 | End: 2025-03-07 | Stop reason: HOSPADM

## 2025-03-07 RX ORDER — PRAVASTATIN SODIUM 40 MG/1
40 TABLET ORAL EVERY OTHER DAY
COMMUNITY

## 2025-03-07 RX ORDER — IPRATROPIUM BROMIDE AND ALBUTEROL SULFATE 2.5; .5 MG/3ML; MG/3ML
3 SOLUTION RESPIRATORY (INHALATION) 3 TIMES DAILY
Status: DISCONTINUED | OUTPATIENT
Start: 2025-03-07 | End: 2025-03-07 | Stop reason: HOSPADM

## 2025-03-07 RX ORDER — MORPHINE SULFATE 4 MG/ML
4 INJECTION, SOLUTION INTRAMUSCULAR; INTRAVENOUS ONCE
Status: COMPLETED | OUTPATIENT
Start: 2025-03-07 | End: 2025-03-07

## 2025-03-07 RX ORDER — IPRATROPIUM BROMIDE AND ALBUTEROL SULFATE 2.5; .5 MG/3ML; MG/3ML
3 SOLUTION RESPIRATORY (INHALATION)
Status: DISCONTINUED | OUTPATIENT
Start: 2025-03-07 | End: 2025-03-07

## 2025-03-07 RX ORDER — ACETAMINOPHEN 325 MG/1
650 TABLET ORAL EVERY 6 HOURS PRN
Status: DISCONTINUED | OUTPATIENT
Start: 2025-03-07 | End: 2025-03-07 | Stop reason: HOSPADM

## 2025-03-07 RX ORDER — MECLIZINE HYDROCHLORIDE 25 MG/1
25 TABLET ORAL 3 TIMES DAILY PRN
Status: DISCONTINUED | OUTPATIENT
Start: 2025-03-07 | End: 2025-03-07 | Stop reason: HOSPADM

## 2025-03-07 RX ORDER — IPRATROPIUM BROMIDE AND ALBUTEROL SULFATE 2.5; .5 MG/3ML; MG/3ML
3 SOLUTION RESPIRATORY (INHALATION) EVERY 2 HOUR PRN
Status: DISCONTINUED | OUTPATIENT
Start: 2025-03-07 | End: 2025-03-07 | Stop reason: HOSPADM

## 2025-03-07 RX ORDER — TALC
3 POWDER (GRAM) TOPICAL NIGHTLY PRN
Status: DISCONTINUED | OUTPATIENT
Start: 2025-03-07 | End: 2025-03-07 | Stop reason: HOSPADM

## 2025-03-07 RX ORDER — INSULIN LISPRO 100 [IU]/ML
0-5 INJECTION, SOLUTION INTRAVENOUS; SUBCUTANEOUS
Status: DISCONTINUED | OUTPATIENT
Start: 2025-03-07 | End: 2025-03-07 | Stop reason: HOSPADM

## 2025-03-07 RX ORDER — PRAVASTATIN SODIUM 20 MG/1
40 TABLET ORAL EVERY OTHER DAY
Status: DISCONTINUED | OUTPATIENT
Start: 2025-03-08 | End: 2025-03-07 | Stop reason: HOSPADM

## 2025-03-07 RX ADMIN — MAGNESIUM SULFATE HEPTAHYDRATE 2 G: 40 INJECTION, SOLUTION INTRAVENOUS at 01:51

## 2025-03-07 RX ADMIN — CETIRIZINE HYDROCHLORIDE 10 MG: 10 TABLET, FILM COATED ORAL at 08:27

## 2025-03-07 RX ADMIN — HEPARIN SODIUM 5000 UNITS: 5000 INJECTION, SOLUTION INTRAVENOUS; SUBCUTANEOUS at 08:27

## 2025-03-07 RX ADMIN — FLUTICASONE PROPIONATE 2 SPRAY: 50 SPRAY, METERED NASAL at 03:21

## 2025-03-07 RX ADMIN — Medication 400 MG: at 15:58

## 2025-03-07 RX ADMIN — HYDROCODONE BITARTRATE AND ACETAMINOPHEN 1 TABLET: 5; 325 TABLET ORAL at 14:06

## 2025-03-07 RX ADMIN — Medication 500 ML: at 06:22

## 2025-03-07 RX ADMIN — ISOSORBIDE MONONITRATE 30 MG: 30 TABLET, EXTENDED RELEASE ORAL at 13:11

## 2025-03-07 RX ADMIN — ONDANSETRON 4 MG: 2 INJECTION INTRAMUSCULAR; INTRAVENOUS at 15:58

## 2025-03-07 RX ADMIN — SPIRONOLACTONE 25 MG: 25 TABLET ORAL at 13:11

## 2025-03-07 RX ADMIN — METOPROLOL TARTRATE 50 MG: 50 TABLET, FILM COATED ORAL at 13:11

## 2025-03-07 RX ADMIN — Medication 400 MG: at 08:27

## 2025-03-07 RX ADMIN — AMLODIPINE BESYLATE 5 MG: 5 TABLET ORAL at 13:11

## 2025-03-07 RX ADMIN — ONDANSETRON 4 MG: 2 INJECTION INTRAMUSCULAR; INTRAVENOUS at 01:50

## 2025-03-07 RX ADMIN — LOSARTAN POTASSIUM 100 MG: 100 TABLET, FILM COATED ORAL at 13:11

## 2025-03-07 RX ADMIN — BUDESONIDE 0.25 MG: 0.25 INHALANT RESPIRATORY (INHALATION) at 13:19

## 2025-03-07 RX ADMIN — MORPHINE SULFATE 4 MG: 4 INJECTION, SOLUTION INTRAMUSCULAR; INTRAVENOUS at 01:50

## 2025-03-07 RX ADMIN — Medication 500 ML: at 13:12

## 2025-03-07 RX ADMIN — IPRATROPIUM BROMIDE AND ALBUTEROL SULFATE 3 ML: 2.5; .5 SOLUTION RESPIRATORY (INHALATION) at 13:19

## 2025-03-07 RX ADMIN — HYDROCODONE BITARTRATE AND ACETAMINOPHEN 1 TABLET: 5; 325 TABLET ORAL at 08:36

## 2025-03-07 RX ADMIN — HYDROCHLOROTHIAZIDE 25 MG: 25 TABLET ORAL at 13:11

## 2025-03-07 SDOH — SOCIAL STABILITY: SOCIAL INSECURITY: HAVE YOU HAD THOUGHTS OF HARMING ANYONE ELSE?: NO

## 2025-03-07 SDOH — SOCIAL STABILITY: SOCIAL INSECURITY
WITHIN THE LAST YEAR, HAVE YOU BEEN KICKED, HIT, SLAPPED, OR OTHERWISE PHYSICALLY HURT BY YOUR PARTNER OR EX-PARTNER?: NO

## 2025-03-07 SDOH — ECONOMIC STABILITY: FOOD INSECURITY: WITHIN THE PAST 12 MONTHS, THE FOOD YOU BOUGHT JUST DIDN'T LAST AND YOU DIDN'T HAVE MONEY TO GET MORE.: NEVER TRUE

## 2025-03-07 SDOH — SOCIAL STABILITY: SOCIAL INSECURITY: ARE YOU OR HAVE YOU BEEN THREATENED OR ABUSED PHYSICALLY, EMOTIONALLY, OR SEXUALLY BY ANYONE?: NO

## 2025-03-07 SDOH — SOCIAL STABILITY: SOCIAL INSECURITY: DOES ANYONE TRY TO KEEP YOU FROM HAVING/CONTACTING OTHER FRIENDS OR DOING THINGS OUTSIDE YOUR HOME?: NO

## 2025-03-07 SDOH — SOCIAL STABILITY: SOCIAL INSECURITY: WERE YOU ABLE TO COMPLETE ALL THE BEHAVIORAL HEALTH SCREENINGS?: YES

## 2025-03-07 SDOH — SOCIAL STABILITY: SOCIAL INSECURITY
WITHIN THE LAST YEAR, HAVE YOU BEEN RAPED OR FORCED TO HAVE ANY KIND OF SEXUAL ACTIVITY BY YOUR PARTNER OR EX-PARTNER?: NO

## 2025-03-07 SDOH — SOCIAL STABILITY: SOCIAL INSECURITY: ABUSE: ADULT

## 2025-03-07 SDOH — SOCIAL STABILITY: SOCIAL INSECURITY: ARE THERE ANY APPARENT SIGNS OF INJURIES/BEHAVIORS THAT COULD BE RELATED TO ABUSE/NEGLECT?: NO

## 2025-03-07 SDOH — SOCIAL STABILITY: SOCIAL INSECURITY: WITHIN THE LAST YEAR, HAVE YOU BEEN HUMILIATED OR EMOTIONALLY ABUSED IN OTHER WAYS BY YOUR PARTNER OR EX-PARTNER?: NO

## 2025-03-07 SDOH — ECONOMIC STABILITY: FOOD INSECURITY: WITHIN THE PAST 12 MONTHS, YOU WORRIED THAT YOUR FOOD WOULD RUN OUT BEFORE YOU GOT THE MONEY TO BUY MORE.: NEVER TRUE

## 2025-03-07 SDOH — ECONOMIC STABILITY: INCOME INSECURITY: IN THE PAST 12 MONTHS HAS THE ELECTRIC, GAS, OIL, OR WATER COMPANY THREATENED TO SHUT OFF SERVICES IN YOUR HOME?: NO

## 2025-03-07 SDOH — SOCIAL STABILITY: SOCIAL INSECURITY: DO YOU FEEL ANYONE HAS EXPLOITED OR TAKEN ADVANTAGE OF YOU FINANCIALLY OR OF YOUR PERSONAL PROPERTY?: NO

## 2025-03-07 SDOH — SOCIAL STABILITY: SOCIAL INSECURITY: HAS ANYONE EVER THREATENED TO HURT YOUR FAMILY OR YOUR PETS?: NO

## 2025-03-07 SDOH — SOCIAL STABILITY: SOCIAL INSECURITY: DO YOU FEEL UNSAFE GOING BACK TO THE PLACE WHERE YOU ARE LIVING?: NO

## 2025-03-07 SDOH — SOCIAL STABILITY: SOCIAL INSECURITY: WITHIN THE LAST YEAR, HAVE YOU BEEN AFRAID OF YOUR PARTNER OR EX-PARTNER?: NO

## 2025-03-07 SDOH — SOCIAL STABILITY: SOCIAL INSECURITY: HAVE YOU HAD ANY THOUGHTS OF HARMING ANYONE ELSE?: NO

## 2025-03-07 ASSESSMENT — COGNITIVE AND FUNCTIONAL STATUS - GENERAL
WALKING IN HOSPITAL ROOM: A LITTLE
MOBILITY SCORE: 24
DAILY ACTIVITIY SCORE: 24
DAILY ACTIVITIY SCORE: 24
MOBILITY SCORE: 22
PATIENT BASELINE BEDBOUND: NO
MOBILITY SCORE: 24
CLIMB 3 TO 5 STEPS WITH RAILING: A LITTLE

## 2025-03-07 ASSESSMENT — ENCOUNTER SYMPTOMS
WEAKNESS: 1
AGITATION: 0
FLANK PAIN: 0
NUMBNESS: 1
LIGHT-HEADEDNESS: 1
CONSTIPATION: 0
NERVOUS/ANXIOUS: 0
ABDOMINAL PAIN: 0
SORE THROAT: 0
NAUSEA: 1
WOUND: 0
COLOR CHANGE: 0
HEMATURIA: 0
FEVER: 0
DYSURIA: 0
DIARRHEA: 0
MYALGIAS: 1
VOMITING: 0
CHEST TIGHTNESS: 0
CONFUSION: 0
SHORTNESS OF BREATH: 0
DYSPHORIC MOOD: 0
BACK PAIN: 0
CHILLS: 0
FATIGUE: 0
PALPITATIONS: 0
DIZZINESS: 1
FREQUENCY: 0
COUGH: 0
TROUBLE SWALLOWING: 0

## 2025-03-07 ASSESSMENT — PAIN SCALES - GENERAL
PAINLEVEL_OUTOF10: 5 - MODERATE PAIN
PAINLEVEL_OUTOF10: 7
PAINLEVEL_OUTOF10: 7
PAINLEVEL_OUTOF10: 0 - NO PAIN
PAINLEVEL_OUTOF10: 4
PAINLEVEL_OUTOF10: 5 - MODERATE PAIN
PAINLEVEL_OUTOF10: 0 - NO PAIN
PAINLEVEL_OUTOF10: 5 - MODERATE PAIN

## 2025-03-07 ASSESSMENT — ACTIVITIES OF DAILY LIVING (ADL)
JUDGMENT_ADEQUATE_SAFELY_COMPLETE_DAILY_ACTIVITIES: YES
HEARING - RIGHT EAR: FUNCTIONAL
LACK_OF_TRANSPORTATION: NO
DRESSING YOURSELF: INDEPENDENT
HEARING - LEFT EAR: FUNCTIONAL
ASSISTIVE_DEVICE: DENTURES UPPER;EYEGLASSES
LACK_OF_TRANSPORTATION: NO
GROOMING: INDEPENDENT
TOILETING: INDEPENDENT
BATHING: INDEPENDENT
WALKS IN HOME: INDEPENDENT
PATIENT'S MEMORY ADEQUATE TO SAFELY COMPLETE DAILY ACTIVITIES?: YES
FEEDING YOURSELF: INDEPENDENT
ADEQUATE_TO_COMPLETE_ADL: YES

## 2025-03-07 ASSESSMENT — LIFESTYLE VARIABLES
AUDIT-C TOTAL SCORE: 0
SKIP TO QUESTIONS 9-10: 1
TOTAL SCORE: 0
AUDIT-C TOTAL SCORE: 0
HOW OFTEN DO YOU HAVE A DRINK CONTAINING ALCOHOL: NEVER
HAVE YOU EVER FELT YOU SHOULD CUT DOWN ON YOUR DRINKING: NO
EVER HAD A DRINK FIRST THING IN THE MORNING TO STEADY YOUR NERVES TO GET RID OF A HANGOVER: NO
EVER FELT BAD OR GUILTY ABOUT YOUR DRINKING: NO
HOW MANY STANDARD DRINKS CONTAINING ALCOHOL DO YOU HAVE ON A TYPICAL DAY: PATIENT DOES NOT DRINK
HOW OFTEN DO YOU HAVE 6 OR MORE DRINKS ON ONE OCCASION: NEVER
HAVE PEOPLE ANNOYED YOU BY CRITICIZING YOUR DRINKING: NO

## 2025-03-07 ASSESSMENT — PAIN DESCRIPTION - LOCATION
LOCATION: BACK
LOCATION: ARM

## 2025-03-07 ASSESSMENT — PAIN - FUNCTIONAL ASSESSMENT
PAIN_FUNCTIONAL_ASSESSMENT: 0-10

## 2025-03-07 ASSESSMENT — PATIENT HEALTH QUESTIONNAIRE - PHQ9
SUM OF ALL RESPONSES TO PHQ9 QUESTIONS 1 & 2: 0
1. LITTLE INTEREST OR PLEASURE IN DOING THINGS: NOT AT ALL
2. FEELING DOWN, DEPRESSED OR HOPELESS: NOT AT ALL

## 2025-03-07 ASSESSMENT — PAIN DESCRIPTION - ORIENTATION: ORIENTATION: MID

## 2025-03-07 ASSESSMENT — COLUMBIA-SUICIDE SEVERITY RATING SCALE - C-SSRS
1. IN THE PAST MONTH, HAVE YOU WISHED YOU WERE DEAD OR WISHED YOU COULD GO TO SLEEP AND NOT WAKE UP?: NO
6. HAVE YOU EVER DONE ANYTHING, STARTED TO DO ANYTHING, OR PREPARED TO DO ANYTHING TO END YOUR LIFE?: NO
2. HAVE YOU ACTUALLY HAD ANY THOUGHTS OF KILLING YOURSELF?: NO

## 2025-03-07 ASSESSMENT — PAIN DESCRIPTION - PROGRESSION: CLINICAL_PROGRESSION: NOT CHANGED

## 2025-03-07 ASSESSMENT — PAIN DESCRIPTION - PAIN TYPE: TYPE: ACUTE PAIN

## 2025-03-07 ASSESSMENT — PAIN DESCRIPTION - ONSET: ONSET: ONGOING

## 2025-03-07 ASSESSMENT — PAIN DESCRIPTION - DESCRIPTORS: DESCRIPTORS: ACHING

## 2025-03-07 ASSESSMENT — PAIN DESCRIPTION - DIRECTION: RADIATING_TOWARDS: LEGS/ARMS

## 2025-03-07 ASSESSMENT — VISUAL ACUITY: OU: 1

## 2025-03-07 ASSESSMENT — PAIN DESCRIPTION - FREQUENCY: FREQUENCY: CONSTANT/CONTINUOUS

## 2025-03-07 NOTE — ED PROVIDER NOTES
Emergency Department Provider Note        History of Present Illness     History provided by: Patient and Family Member  Limitations to History: None  External Records Reviewed with Brief Summary: None    HPI:  Lisa Andrade is a 74 y.o. female presenting to the [facility] emergency department with a chief complaint of general weakness, shoulder and extremity pain/aching, and nausea.  Patient has a history of fibromyalgia, diabetes, hypertension, hyperlipidemia, lung cancer status postchemotherapy radiation, breast cancer status postchemotherapy and radiation, and had a stent placed in 2007 as well as requiring other heart catheterizations.  Patient has generalized malaise, weakness, bone aching in her upper and lower bilateral extremities, and experiences vertiginous episodes off and on while at rest and even while moving.  The patient normally takes hydrocodone for pain from fibromyalgia but his hydrocodone is not helping out with her current symptoms.  The patient has had to be replenished of her magnesium levels on March 2 and again on February 28 in the emergency department.  Other than this weakness, pain, dizziness, and nausea the patient is not complaining of any other negative symptoms.    Physical Exam   Triage vitals:  T 36.5 °C (97.7 °F)  HR 70  /64  RR 19  O2 96 % None (Room air)    Physical Exam  Vitals and nursing note reviewed.   Constitutional:       General: She is not in acute distress.     Appearance: Normal appearance. She is not ill-appearing, toxic-appearing or diaphoretic.   HENT:      Head: Normocephalic and atraumatic.      Right Ear: External ear normal.      Left Ear: External ear normal.      Nose: Nose normal. No congestion or rhinorrhea.      Mouth/Throat:      Mouth: Mucous membranes are moist.      Pharynx: Oropharynx is clear.   Eyes:      General: No scleral icterus.        Right eye: No discharge.         Left eye: No discharge.      Extraocular Movements:  Extraocular movements intact.      Conjunctiva/sclera: Conjunctivae normal.      Pupils: Pupils are equal, round, and reactive to light.   Cardiovascular:      Rate and Rhythm: Normal rate and regular rhythm.      Pulses: Normal pulses.      Heart sounds: Normal heart sounds.   Pulmonary:      Effort: Pulmonary effort is normal. No respiratory distress.      Breath sounds: Normal breath sounds.   Chest:      Chest wall: No tenderness.   Abdominal:      General: There is no distension.      Palpations: Abdomen is soft.      Tenderness: There is no abdominal tenderness. There is no right CVA tenderness, left CVA tenderness, guarding or rebound.   Musculoskeletal:         General: Tenderness present. No swelling, deformity or signs of injury.      Cervical back: Normal range of motion and neck supple. No rigidity or tenderness.      Right lower leg: No edema.      Left lower leg: No edema.      Comments: Tenderness over scapula down to wrist bilaterally.  Tenderness from hip down to ankle bilaterally.   Skin:     General: Skin is warm and dry.      Capillary Refill: Capillary refill takes less than 2 seconds.   Neurological:      General: No focal deficit present.      Mental Status: She is alert and oriented to person, place, and time.   Psychiatric:         Mood and Affect: Mood normal.         Behavior: Behavior normal.          Medical Decision Making & ED Course   Medical Decision Makin y.o. female presenting for body aches to the shoulders and bilateral upper and lower extremities, and has no midline tenderness to her spine.  She has not been suffering from any falls from her overall weakness and dizziness, she does live by herself and knows to not get up and walk around when she is having her dizzy spells.    The pain medication that she uses for fibromyalgia is not working, morphine and Zofran will be given in the emergency department for pain and nausea relief.    The patient vitals show that she is  hemodynamically stable while presenting to the emergency room.    The patient has lab values showing hypomagnesemia again, she is given a replacement dose of magnesium.    On my interpretation of labs, results are unimpressive for systemic inflammation or infection, acute anemia or blood loss, MEMO, hepatitis, or electrolyte abnormalities except for the hypomagnesemia.     Patient CT of the head came back showing no acute intracranial abnormalities.    The patient was signed to Dr. Richard at 2:30 AM, pending the patient's admission for refractory hypomagnesemia.  ----      Differential diagnoses considered include but are not limited to: Hypomagnesemia     Social Determinants of Health which Significantly Impact Care: None identified     EKG Independent Interpretation: EKG interpreted by myself. Please see ED Course for full interpretation.    Independent Result Review and Interpretation: Relevant laboratory and radiographic results were reviewed and independently interpreted by myself.  As necessary, they are commented on in the ED Course.    Chronic conditions affecting the patient's care: As documented above in [facility]    The patient was discussed with the following consultants/services: None    Care Considerations: As documented above in [facility]    ED Course:  ED Course as of 03/07/25 0233   Fri Mar 07, 2025   0152 XR chest 1 view  X-ray of the chest shows no acute cardiopulmonary pathologies [AYDEN]   0203 ECG 12 lead  EKG at 1:26 AM shows the patient to have sinus rhythm.  Ventricular rate between 60 and 70 bpm.  Normal axis.  MI interval normal.  QRS duration narrow.  QTc normal.  No acute ischemia noted.  No ST elevations noted. [AYDEN]   0225 CT head wo IV contrast  No acute intracranial abnormality. [CB]   0225 Flu A Result: Not Detected [CB]   0225 Flu B Result: Not Detected [CB]   0225 Coronavirus 2019, PCR: Not Detected [CB]   0226 PHOSPHORUS: 3.7  normal [CB]   0226 Comprehensive metabolic panel(!)  Hypomagnesemia,  mild hyponatremia, otherwise normoglycemic, no acute electrolyte or hepatorenal abnormality [CB]   0226 Troponin I, High Sensitivity: 8  Not elevated doubt acs or myopericarditis [CB]      ED Course User Index  [CB] Florentino Richard DO  [AYDEN] Alfredo Schroeder DO         Diagnoses as of 03/07/25 0233   Hypomagnesemia     Disposition   The patient was signed to Dr. Richard at 2:30 AM.    Procedures   Procedures    Patient seen and discussed with ED attending physician.    Alfreod Schroeder DO  Emergency Medicine     Alfredo Schroeder DO  Resident  03/07/25 0233

## 2025-03-07 NOTE — ASSESSMENT & PLAN NOTE
-Patient has been having ongoing sinus congestion for the past few weeks, has been intermittently using Afrin nasal spray which is longer than the recommended duration of 3 days  -She is likely experiencing rebound sinus congestion which can contribute to dizzy or vertiginous feeling  -Discontinue Afrin nasal spray  -Start daily flonase  -Daily loratadine

## 2025-03-07 NOTE — PROGRESS NOTES
Physical Therapy    Physical Therapy Evaluation    Patient Name: Lisa Andrade  MRN: [id number]  Today's Date: 3/7/2025   Time Calculation  Start Time: 0938  Stop Time: 0946  Time Calculation (min): 8 min  3105/3105-A    Assessment/Plan   PT Assessment: Pt demonstrates impairments listed below.  Pt appears below baseline level of function and based on current level of function, pt would benefit from continued skilled therapy while in the hospital to ensure safety, decrease risk of falls, and regain strength/mobility back to baseline.  Once stable enough for discharge, pt would benefit from low intensity therapy in OP setting in balance deficits persist.     PT Assessment Results: Impaired balance, Decreased mobility, Impaired sensation, Pain  Rehab Prognosis: Good  Evaluation/Treatment Tolerance: Patient tolerated treatment well  Medical Staff Made Aware: Yes  End of Session Communication: Bedside nurse  End of Session Patient Position: Bed, 3 rail up, Alarm on  IP OR SWING BED PT PLAN  Inpatient or Swing Bed: Inpatient  PT Plan  Treatment/Interventions: Bed mobility, Transfer training, Gait training, Stair training, Balance training, Neuromuscular re-education, Strengthening, Endurance training, Range of motion, Therapeutic exercise, Therapeutic activity, Home exercise program  PT Plan: Ongoing PT  PT Frequency: One time follow up visit  PT Discharge Recommendations: Low intensity level of continued care (OP PT if deficits persist)  PT Recommended Transfer Status: Stand by assist  PT - OK to Discharge: Yes - To next level of care when cleared by medical team    Subjective     Current Problem:  1. Hypomagnesemia        2. Dizziness        3. Vertigo            Past Medical History:  Patient Active Problem List   Diagnosis    Cervicalgia    Chest pain    Erythema of breast    Failed back syndrome of cervical spine    Fibromyalgia    Heart rate fast    Hyperlipidemia    Hyperkalemia    Injury of triangular  fibrocartilage complex of right wrist    Lung cancer (Multi)    Lung nodule    Malignant neoplasm of lower-outer quadrant of left female breast    Nicotine dependence    Noncompliance with therapeutic plan    Other cervical disc displacement, unspecified cervical region    Premature ventricular contractions    Right wrist pain    Shortness of breath    Spondylosis of cervical region without myelopathy or radiculopathy    Sprain of right wrist    Overweight    Hypertension    Arteriosclerosis of coronary artery    Obesity (BMI 30.0-34.9)    Malignant neoplasm of central portion of right breast in female, estrogen receptor positive    Chronic obstructive pulmonary disease (Multi)    Gastroesophageal reflux disease with esophagitis without hemorrhage    Osteoarthritis    Type 2 diabetes mellitus without complication, without long-term current use of insulin (Multi)    Peripheral polyneuropathy    Former smoker    Chemotherapy-induced peripheral neuropathy (Multi)    Hypomagnesemia    Dizzinesses    Nasal sinus congestion       General Visit Information:  Per EMR: pt presents to [facility] on 3/7/2025 from home with complaints of generalized weakness and muscle aches.     Patient states she has been experiencing generalized weakness with muscle aches/cramping which started over the past 7 to 10 days and is not relieved with the hydrocodone she takes for fibromyalgia pain.  She also endorses intermittent episodes of dizziness/lightheadedness since last Friday. This is not worsened with changes in position or head movements. She tells me she was in the ER on 3/2 with similar complaints at which time her magnesium was found to be critically low. She received IV replacement and a prescription for oral magnesium which she has been compliant with and PRN meclizine which has not helped her symptoms. Prior to that she was seen at [facility] on 2/28, and had been treated with IV fluids and magnesium at that time as well. Despite  these interventions, patient's magnesium levels have been persistently low. She does mention experiencing ongoing nasal congestion for the past 2 to 3 weeks for which she has been using Afrin. We spoke about rebound congestion from overuse of afrin and that this may also be contributing to her dizziness/lightheadedness because it can put pressure on the middle ear. She denies any recent fever, chills, headache, changes in vision, chest pain, shortness of breath, cough, abdominal pain, vomiting, diarrhea, dysuria, or hematuria.    On arrival, pt supine in bed.  Pt in no apparent distress and agreeable to therapy.    General  Reason for Referral: impaired mobility  Referred By: Dash Sommer  Prior to Session Communication: Bedside nurse  Patient Position Received: Bed, 3 rail up, Alarm on    Home Living/PLOF:  Pt lives alone in duplex with 3 [address]OROT[address] with rail.  1 floor set up aside from laundry in basement with 12 steps down with rail.  Sleeps in hospital bed.  Has tub shower without DME.  IND with mobility and ADLs.  Pt usually drives but has not been driving this past week 2/2 symptoms, but states daughters have been assisting with driving and shopping.  Denies any falls.  Pt's daughter lives 4 houses down.     Precautions:  Precautions  Medical Precautions: Fall precautions     Objective     Pain:  Pain Assessment  Pain Assessment: 0-10  0-10 (Numeric) Pain Score: 5 - Moderate pain  Pain Type: Chronic pain  Pain Location: Back  Pain Orientation: Mid  Pain Interventions: Repositioned    Cognition:  Cognition  Overall Cognitive Status: Within Functional Limits  Orientation Level: Oriented X4    General Assessments:      Activity Tolerance  Endurance: Endurance does not limit participation in activity  Sensation  Sensation Comment: pt reports numbness/tingling in BLE and B hands     Static Sitting Balance  Static Sitting-Comment/Number of Minutes: good  Dynamic Sitting Balance  Dynamic Sitting-Comments: good  Static  Standing Balance  Static Standing-Comment/Number of Minutes: good  Dynamic Standing Balance  Dynamic Standing-Comments: fair plus    Extremity/Trunk Assessments:  BLE strength: grossly WFL    Functional Mobility:  Bed mobility  Supine to sit: IND  Sit to supine: IND    Transfers  Sit to stand: SUP  Stand to sit: SUP    Ambulation/Stairs  Pt ambulated 20 ft with SBA without device; no major LOB but reports she is moving slower than normal     Outcome Measures:  [facility] Basic Mobility  Turning from your back to your side while in a flat bed without using bedrails: None  Moving from lying on your back to sitting on the side of a flat bed without using bedrails: None  Moving to and from bed to chair (including a wheelchair): None  Standing up from a chair using your arms (e.g. wheelchair or bedside chair): None  To walk in hospital room: A little  Climbing 3-5 steps with railing: A little  Basic Mobility - Total Score: 22    Goals:  Encounter Problems       Encounter Problems (Active)       PT Problem       Pt will be able to perform all bed mobility tasks with IND.  (Progressing)       Start:  03/07/25    Expected End:  03/21/25            Pt will perform all transfers with IND with proper safety mechanics.   (Progressing)       Start:  03/07/25    Expected End:  03/21/25            Pt will ambulate 200 ft with IND for improved functional independence.  (Progressing)       Start:  03/07/25    Expected End:  03/21/25            Pt will be able to negotiate 3 steps with 1 HR with Mod I.  (Progressing)       Start:  03/07/25    Expected End:  03/21/25                 Education Documentation  Body Mechanics, taught by Faith Mathews, PT at 3/7/2025  2:31 PM.  Learner: Patient  Readiness: Acceptance  Method: Explanation  Response: Verbalizes Understanding    Home Exercise Program, taught by Faith Mathews, PT at 3/7/2025  2:31 PM.  Learner: Patient  Readiness: Acceptance  Method: Explanation  Response: Verbalizes  Understanding    Mobility Training, taught by Faith Mathews, PT at 3/7/2025  2:31 PM.  Learner: Patient  Readiness: Acceptance  Method: Explanation  Response: Verbalizes Understanding    Education Comments  No comments found.

## 2025-03-07 NOTE — H&P
History Of Present Illness  Lisa Andrade is a 74 y.o. female with past medical history significant for HTN, HLD, CAD, history of MI s/p PCI with stent, non-insulin-dependent type 2 diabetes melitis, emphysema, fibromyalgia, rheumatoid arthritis, iron deficiency anemia, breast cancer and small cell lung cancer s/p chemotherapy and radiation presents to [facility] on 3/7/2025 from home with complaints of generalized weakness and muscle aches.    Patient states she has been experiencing generalized weakness with muscle aches/cramping which started over the past 7 to 10 days and is not relieved with the hydrocodone she takes for fibromyalgia pain.  She also endorses intermittent episodes of dizziness/lightheadedness since last Friday. This is not worsened with changes in position or head movements. She tells me she was in the ER on 3/2 with similar complaints at which time her magnesium was found to be critically low. She received IV replacement and a prescription for oral magnesium which she has been compliant with and PRN meclizine which has not helped her symptoms. Prior to that she was seen at [facility] on 2/28, and had been treated with IV fluids and magnesium at that time as well. Despite these interventions, patient's magnesium levels have been persistently low. She does mention experiencing ongoing nasal congestion for the past 2 to 3 weeks for which she has been using Afrin. We spoke about rebound congestion from overuse of afrin and that this may also be contributing to her dizziness/lightheadedness because it can put pressure on the middle ear. She denies any recent fever, chills, headache, changes in vision, chest pain, shortness of breath, cough, abdominal pain, vomiting, diarrhea, dysuria, or hematuria.    ED Course:  Vital signs: Temp. 97.7F, HR 70 bpm, RR 19, /64, SPO2 96% on room air  CMP: Glucose 125, Na+ 134, creat 1.09, EGFR 53 (at baseline), normal LFTs  Magnesium: 1.48  Troponin: 8, 7  CBC  w/diff: No leukocytosis, H&H 9.4/30.4 (stable, close to baseline)  Influenza A/B and COVID-19 negative  CXR: No acute cardiopulmonary disease.  Cervical fusion hardware noted  CT head: No acute intracranial abnormality  EKG: Normal sinus rhythm, ventricular rate 68 bpm,  ms, QRS 82 ms, QTc 412 ms. No acute ST elevation or depression noted.  Medications Given: Magnesium sulfate 2 g IV x 1, morphine 4 mg IV x 1, ondansetron 4 mg IV x 1  Disposition: Patient admitted for recurrent hypomagnesemia and dizziness/lightheadedness.    Past Medical History  Past Medical History:   Diagnosis Date    Breast cancer (Multi)     Chemotherapy-induced peripheral neuropathy (Multi)     Emphysema of lung (Multi)     Failed back syndrome of cervical spine 01/08/2024    Fibromyalgia     Former smoker     GERD (gastroesophageal reflux disease)     Hiatal hernia     History of cardiac murmur     Hx antineoplastic chemo     Hyperlipidemia     Hypertension     Hypokalemia     Iron deficiency anemia     Old myocardial infarction     Personal history of irradiation     Personal history of malignant neoplasm of breast     Rheumatoid arthritis     Type 2 diabetes mellitus without complication, without long-term current use of insulin (Multi)         Surgical History  Past Surgical History:   Procedure Laterality Date    APPENDECTOMY  03/28/2016    Appendectomy    BREAST LUMPECTOMY Left 01/18/2013    Breast CA    CERVICAL FUSION  04/19/2016    Cervical Vertebral Fusion    CHOLECYSTECTOMY  03/28/2016    Cholecystectomy    COLONOSCOPY  06/20/2016    Colonoscopy (Fiberoptic)    CORONARY ANGIOPLASTY WITH STENT PLACEMENT  06/20/2016    Cath Placement Of Stent 1    HYSTERECTOMY  08/23/2013    Hysterectomy    MR HEAD ANGIO WO IV CONTRAST  08/18/2023    MR HEAD ANGIO WO IV CONTRAST 8/18/2023 [facility] MRI    OTHER SURGICAL HISTORY  08/23/2013    Abdominal Surgery    OTHER SURGICAL HISTORY  11/20/2021    Back surgery    OTHER SURGICAL HISTORY  11/20/2021     Percutaneous transluminal coronary angioplasty    OTHER SURGICAL HISTORY  03/28/2016    History Of Prior Surgery    OTHER SURGICAL HISTORY  03/28/2016    Ant Spinal Diskectomy, Osteophytectomy Addl Cerv Interspace       Social History  She reports that she has quit smoking. Her smoking use included cigarettes. She has never used smokeless tobacco. She reports that she does not currently use alcohol. She reports that she does not use drugs.    Family History  Family History   Problem Relation Name Age of Onset    Hypertension Mother      Coronary artery disease Mother      Migraines Mother      No Known Problems Father      Hodgkin's lymphoma Sister      Uterine cancer Sister          Allergies  Fish oil, Lipitor [atorvastatin], Wasp venom, and Zocor [simvastatin]    Review of Systems   Constitutional:  Negative for chills, fatigue and fever.   HENT:  Positive for congestion. Negative for hearing loss, sore throat and trouble swallowing.    Eyes:  Negative for visual disturbance.   Respiratory:  Negative for cough, chest tightness and shortness of breath.    Cardiovascular:  Negative for chest pain and palpitations.   Gastrointestinal:  Positive for nausea. Negative for abdominal pain, constipation, diarrhea and vomiting.   Genitourinary:  Negative for dysuria, flank pain, frequency, hematuria and urgency.   Musculoskeletal:  Positive for myalgias (Chronic). Negative for back pain and gait problem.   Skin:  Negative for color change, rash and wound.   Neurological:  Positive for dizziness, weakness (Generalized), light-headedness and numbness (with tingling, chronic due to chemotherapy associated peripheral neuropathy). Negative for syncope.   Psychiatric/Behavioral:  Negative for agitation, confusion and dysphoric mood. The patient is not nervous/anxious.       Physical Exam  Vitals reviewed.   Constitutional:       General: She is not in acute distress.  HENT:      Head: Normocephalic.      Right Ear: External  "ear normal.      Left Ear: External ear normal.      Nose: Congestion present.      Mouth/Throat:      Mouth: Mucous membranes are moist.   Eyes:      General: Lids are normal. Vision grossly intact.      Pupils: Pupils are equal, round, and reactive to light.   Cardiovascular:      Rate and Rhythm: Normal rate and regular rhythm.      Pulses:           Radial pulses are 2+ on the right side and 2+ on the left side.      Heart sounds: S1 normal and S2 normal.   Pulmonary:      Effort: Pulmonary effort is normal. No respiratory distress.      Breath sounds: Normal breath sounds. No wheezing, rhonchi or rales.   Abdominal:      General: Bowel sounds are normal. There is no distension.      Palpations: Abdomen is soft.      Tenderness: There is no abdominal tenderness.   Musculoskeletal:      Right lower leg: No edema.      Left lower leg: No edema.   Skin:     General: Skin is warm and dry.      Capillary Refill: Capillary refill takes less than 2 seconds.   Neurological:      Mental Status: She is alert and oriented to person, place, and time. Mental status is at baseline.      Sensory: Sensation is intact.      Motor: Motor function is intact.   Psychiatric:         Attention and Perception: Attention normal.         Mood and Affect: Mood normal.         Speech: Speech normal.         Behavior: Behavior normal. Behavior is cooperative.       Last Recorded Vitals  Blood pressure 119/58, pulse 65, temperature 36.5 °C (97.7 °F), temperature source Temporal, resp. rate (!) 25, height 1.613 m (5' 3.5\"), weight 77.1 kg (170 lb), SpO2 96%.    Pain Assessment: 0-10  0-10 (Numeric) Pain Score: 5 - Moderate pain  Pain Type: Acute pain  Pain Location: Arm  Pain Orientation: -- (bilateral arms)  Pain Radiating Towards: legs/arms  Pain Descriptors: Aching  Pain Frequency: Constant/continuous    Relevant Results  Results for orders placed or performed during the hospital encounter of 03/07/25 (from the past 24 hours)   CBC and " Auto Differential   Result Value Ref Range    WBC 6.0 4.4 - 11.3 x10*3/uL    nRBC 0.0 0.0 - 0.0 /100 WBCs    RBC 3.19 (L) 4.00 - 5.20 x10*6/uL    Hemoglobin 9.4 (L) 12.0 - 16.0 g/dL    Hematocrit 30.4 (L) 36.0 - 46.0 %    MCV 95 80 - 100 fL    MCH 29.5 26.0 - 34.0 pg    MCHC 30.9 (L) 32.0 - 36.0 g/dL    RDW 13.8 11.5 - 14.5 %    Platelets 213 150 - 450 x10*3/uL    Neutrophils % 50.6 40.0 - 80.0 %    Immature Granulocytes %, Automated 0.2 0.0 - 0.9 %    Lymphocytes % 33.8 13.0 - 44.0 %    Monocytes % 12.4 2.0 - 10.0 %    Eosinophils % 2.5 0.0 - 6.0 %    Basophils % 0.5 0.0 - 2.0 %    Neutrophils Absolute 3.01 1.60 - 5.50 x10*3/uL    Immature Granulocytes Absolute, Automated 0.01 0.00 - 0.50 x10*3/uL    Lymphocytes Absolute 2.01 0.80 - 3.00 x10*3/uL    Monocytes Absolute 0.74 0.05 - 0.80 x10*3/uL    Eosinophils Absolute 0.15 0.00 - 0.40 x10*3/uL    Basophils Absolute 0.03 0.00 - 0.10 x10*3/uL   Magnesium   Result Value Ref Range    Magnesium 1.48 (L) 1.60 - 2.40 mg/dL   Comprehensive metabolic panel   Result Value Ref Range    Glucose 125 (H) 74 - 99 mg/dL    Sodium 134 (L) 136 - 145 mmol/L    Potassium 4.6 3.5 - 5.3 mmol/L    Chloride 100 98 - 107 mmol/L    Bicarbonate 24 21 - 32 mmol/L    Anion Gap 15 10 - 20 mmol/L    Urea Nitrogen 18 6 - 23 mg/dL    Creatinine 1.09 (H) 0.50 - 1.05 mg/dL    eGFR 53 (L) >60 mL/min/1.73m*2    Calcium 9.1 8.6 - 10.3 mg/dL    Albumin 4.3 3.4 - 5.0 g/dL    Alkaline Phosphatase 85 33 - 136 U/L    Total Protein 6.8 6.4 - 8.2 g/dL    AST 28 9 - 39 U/L    Bilirubin, Total 0.2 0.0 - 1.2 mg/dL    ALT 17 7 - 45 U/L   Troponin I, High Sensitivity, Initial   Result Value Ref Range    Troponin I, High Sensitivity 8 0 - 13 ng/L   Phosphorus   Result Value Ref Range    Phosphorus 3.7 2.5 - 4.9 mg/dL   Influenza A, and B PCR   Result Value Ref Range    Flu A Result Not Detected Not Detected    Flu B Result Not Detected Not Detected   Sars-CoV-2 PCR   Result Value Ref Range    Coronavirus 2019, PCR  Not Detected Not Detected   Troponin, High Sensitivity, 1 Hour   Result Value Ref Range    Troponin I, High Sensitivity 7 0 - 13 ng/L      CT head wo IV contrast    Result Date: 3/7/2025  Interpreted By:  Tsering Whitten, STUDY: CT HEAD WO IV CONTRAST;  3/7/2025 2:16 am   INDICATION: Signs/Symptoms:Dizziness.   COMPARISON: 02/20/2025   ACCESSION NUMBER(S): [id number]   ORDERING CLINICIAN: DUSTIN BUSBY   TECHNIQUE: Axial noncontrast CT images of the head.   FINDINGS: BRAIN PARENCHYMA: Mild deep and periventricular white matter hypodensities are nonspecific, but favored to represent chronic small vessel ischemic changes.  Gray-white matter interfaces are preserved. No mass effect or midline shift.   HEMORRHAGE: No acute intracranial hemorrhage. VENTRICLES and EXTRA-AXIAL SPACES: The ventricles and sulci are within normal limits in size for brain volume. No abnormal extraaxial fluid collection. EXTRACRANIAL SOFT TISSUES: Within normal limits. PARANASAL SINUSES/MASTOIDS:  The visualized paranasal sinuses and mastoid air cells are aerated. CALVARIUM: No depressed skull fracture. No destructive osseous lesion.   OTHER FINDINGS: None.       No acute intracranial abnormality.     MACRO: None   Signed by: Tsering Whitten 3/7/2025 2:21 AM Dictation workstation:   [id number]    XR chest 1 view    Result Date: 3/7/2025  STUDY: Chest Radiograph;  3/07/2025 1:32AM INDICATION: Dizziness. COMPARISON: 5/17/2023 XR chest ACCESSION NUMBER(S): [id number] ORDERING CLINICIAN: DUSTIN BUSBY TECHNIQUE:  Frontal chest was obtained at 01:32 hours. FINDINGS: CARDIOMEDIASTINAL SILHOUETTE: Cardiomediastinal silhouette is normal in size and configuration.  LUNGS: Lungs are clear.  Surgical clips are seen overlying the left costophrenic angle.  ABDOMEN: No remarkable upper abdominal findings.  BONES: No acute osseous changes.  Cervical fusion hardware is noted.    No acute cardiopulmonary disease. Signed by aJvier Graves  Medications  Prior to Admission medications    Medication Sig Start Date End Date Taking? Authorizing Provider   amLODIPine (Norvasc) 5 mg tablet Take 1 tablet (5 mg) by mouth once daily. 3/10/11   Historical Provider, MD   fluticasone-umeclidin-vilanter (Trelegy Ellipta) 100-62.5-25 mcg blister with device Inhale 1 puff once daily.    Historical Provider, MD   glipiZIDE XL (Glucotrol XL) 2.5 mg 24 hr tablet Take 1 tablet (2.5 mg) by mouth once daily.    Historical Provider, MD   hydroCHLOROthiazide (HYDRODiuril) 25 mg tablet Take 1 tablet (25 mg) by mouth once daily.    Historical Provider, MD   HYDROcodone-acetaminophen (Norco) 5-325 mg tablet Take 1 tablet by mouth every 6 hours if needed.    Historical Provider, MD   ipratropium (Atrovent) 0.02 % nebulizer solution Inhale 2.5 mL (0.5 mg) 4 times a day.  Patient taking differently: Inhale 2.5 mL (0.5 mg) 4 times a day. prn 7/12/23   Historical Provider, MD   isosorbide mononitrate ER (Imdur) 30 mg 24 hr tablet Take 1 tablet (30 mg) by mouth once daily.    Historical Provider, MD   losartan (Cozaar) 100 mg tablet Take 1 tablet (100 mg) by mouth once daily.    Historical Provider, MD   magnesium oxide (Mag-Ox) 400 mg tablet Take 1 tablet (400 mg) by mouth 2 times a day for 14 days. 3/2/25 3/16/25  Erik Trujillo DO   meclizine (Antivert) 25 mg tablet Take 1 tablet (25 mg) by mouth 3 times a day as needed for dizziness for up to 10 days. 3/2/25 3/12/25  Erik Trujillo DO   metFORMIN (Glucophage) 500 mg tablet Take 1 tablet (500 mg) by mouth 2 times daily (morning and late afternoon). 9/27/12   Historical Provider, MD   metoprolol tartrate (Lopressor) 50 mg tablet Take 1 tablet by mouth twice a day.    Historical Provider, MD   nitroglycerin (Nitrostat) 0.4 mg SL tablet Place 1 tablet (0.4 mg) under the tongue every 5 minutes if needed for chest pain. 9/30/21   Historical Provider, MD   omeprazole (PriLOSEC) 20 mg DR capsule Take 1 capsule (20 mg) by mouth once daily in  the morning. Take before meals. 8/25/23   Historical Provider, MD   pravastatin (Pravachol) 80 mg tablet Take 1 tablet (80 mg) by mouth once daily. Alternate 40 mg daily and 80 mg daily    Historical Provider, MD   spironolactone (Aldactone) 25 mg tablet Take 1 tablet (25 mg) by mouth once daily.    Historical Provider, MD   magnesium oxide (Mag-Ox) 400 mg tablet Take 1 tablet (400 mg) by mouth 2 times a day for 14 days. 3/2/25 3/2/25  Erik Trujillo DO   meclizine (Antivert) 25 mg tablet Take 1 tablet (25 mg) by mouth 3 times a day as needed for dizziness for up to 10 days. 3/2/25 3/2/25  Erik Trujillo DO       Medications  Scheduled medications  cetirizine, 10 mg, oral, Daily  fluticasone, 2 spray, Each Nostril, Daily  heparin (porcine), 5,000 Units, subcutaneous, q8h  insulin lispro, 0-5 Units, subcutaneous, Before meals & nightly  magnesium oxide, 400 mg, oral, TID  oral hydration, 500 mL, oral, q6h JUNIOR      Continuous medications     PRN medications  PRN medications: acetaminophen **OR** acetaminophen, dextrose, dextrose, glucagon, glucagon, HYDROcodone-acetaminophen, melatonin, polyethylene glycol        Assessment/Plan   Assessment & Plan  Hypomagnesemia  -This is likely the primary reason behind muscle aches and cramps/weakness over the past 1 week  -Given 2 g IV magnesium sulfate in ER, recheck with morning labs  -Increased Mag-Ox from 400 mg twice daily to 400 mg p.o. 3 times daily for persistent hypomagnesemia  -Notify provider of Mg2+ less than 2.0  -Continuous telemetry monitoring  Nasal sinus congestion  -Patient has been having ongoing sinus congestion for the past few weeks, has been intermittently using Afrin nasal spray which is longer than the recommended duration of 3 days  -She is likely experiencing rebound sinus congestion which can contribute to dizzy or vertiginous feeling  -Discontinue Afrin nasal spray  -Start daily flonase  -Daily loratadine  Dizzinesses  #Lightheadedness  -CT head: negative  for intracranial abnormality  -Patient states she was given a prescription for meclizine when she was in the ER last week, however this has not helped relieve her dizziness/lightheadedness  -Likely multifactorial, associated with hypomagnesemia and nasal sinus congestion as mentioned above  -Obtain orthostatic vital signs to rule out significant changes in BP or HR  -Encourage oral hydration   -Can consider neurology consult if symptoms do not resolve    Additional Orders:  Cardiac, carb consistent diet  Sliding scale insulin with as needed hypoglycemia treatment  Monitor for hypo-/hyperglycemia signs and symptoms  Caution with nephrotoxic medication  Telemetry monitoring   Vital signs with BP, HR, & RR Q 4 hours.  Pulse ox Q 4 hours.   Admission height and weight.  Aspiration precautions.  Fall precautions   Out of bed with assistance   PT/OT eval and treat as indicated   Call physician for temperature < 36.5 or >38.0 degrees celsius.  Call physician for pulse <50 or >120.  Call physician for respiratory rate <10 or >22.  Call physician for systolic blood pressure <90 or >170.  Call physician for diastolic blood pressure < 50 or >100.  Call physician for pulse ox <92%.    VTE prophylaxis:   Subcutaneous heparin  BLE SCDs.  Monitor for s/s of bleeding    Code Status: DNR and No Intubation.  Discussed with patient who verbalized understanding of chosen CODE STATUS.  [place] DNR form completed and attached to EMR.    Medication reconciliation to be completed when nursing/pharmacy  are able to verify patient's accurate home medications.    See orders for further plan of care. Any further evaluation and management per attending and consulting physicians.      This note has been transcribed using Dragon voice recognition system and there is a possibility of unintentional typing misprints.  Any information found to be copied from previous providers is done in the best interest of the patient to provide accurate, quality, and  continuity of care.     I spent 45 minutes in the professional and overall care of this patient.      CINTIA Rivero-CNP  [facility]

## 2025-03-07 NOTE — CARE PLAN
The patient's goals for the shift include      The clinical goals for the shift include pt will remain HDS this shift    Goals progressing, safety maintained. Tele  in place, waiting for am lab results.

## 2025-03-07 NOTE — CARE PLAN
Pt ambulated 380 ft (charted in flowsheet) with this RN, without any assistive device other than the gait belt, did not need rest breaks. He does take short strides. HR reached into 130-140s, A-Fib. Returned to 108 resting at end of ambulation. Denied any SOB. Will continue to assess. The patient's goals for the shift include      The clinical goals for the shift include pt will remain HDS this shift      Problem: Pain - Adult  Goal: Verbalizes/displays adequate comfort level or baseline comfort level  Outcome: Progressing     Problem: Safety - Adult  Goal: Free from fall injury  Outcome: Progressing     Problem: Discharge Planning  Goal: Discharge to home or other facility with appropriate resources  Outcome: Progressing     Problem: Chronic Conditions and Co-morbidities  Goal: Patient's chronic conditions and co-morbidity symptoms are monitored and maintained or improved  Outcome: Progressing     Problem: Nutrition  Goal: Nutrient intake appropriate for maintaining nutritional needs  Outcome: Progressing     Problem: Diabetes  Goal: Achieve decreasing blood glucose levels by end of shift  Outcome: Progressing  Goal: Increase stability of blood glucose readings by end of shift  Outcome: Progressing  Goal: Decrease in ketones present in urine by end of shift  Outcome: Progressing  Goal: Maintain electrolyte levels within acceptable range throughout shift  Outcome: Progressing  Goal: Maintain glucose levels >70mg/dl to <250mg/dl throughout shift  Outcome: Progressing  Goal: No changes in neurological exam by end of shift  Outcome: Progressing  Goal: Learn about and adhere to nutrition recommendations by end of shift  Outcome: Progressing  Goal: Vital signs within normal range for age by end of shift  Outcome: Progressing  Goal: Increase self care and/or family involovement by end of shift  Outcome: Progressing  Goal: Receive DSME education by end of shift  Outcome: Progressing     Problem: Pain  Goal: Takes deep breaths with improved pain control throughout the shift  Outcome: Progressing  Goal: Turns in bed with improved pain control throughout the shift  Outcome: Progressing  Goal: Walks with improved pain control throughout the shift  Outcome: Progressing  Goal: Performs ADL's with improved  pain control throughout shift  Outcome: Progressing  Goal: Participates in PT with improved pain control throughout the shift  Outcome: Progressing  Goal: Free from opioid side effects throughout the shift  Outcome: Progressing  Goal: Free from acute confusion related to pain meds throughout the shift  Outcome: Progressing

## 2025-03-07 NOTE — ASSESSMENT & PLAN NOTE
-This is likely the primary reason behind muscle aches and cramps/weakness over the past 1 week  -Given 2 g IV magnesium sulfate in ER, recheck with morning labs  -Increased Mag-Ox from 400 mg twice daily to 400 mg p.o. 3 times daily for persistent hypomagnesemia  -Notify provider of Mg2+ less than 2.0  -Continuous telemetry monitoring

## 2025-03-07 NOTE — H&P
History Of Present Illness  Lisa Andrade is a 74 y.o. F from home with PMH of HTN, HLD, CAD, s/p PCI and stent, T2DM, breast and lung cancer, s/p chemo and radiation therapy presented to ER due to generalized weakness fatigue and muscle ache she was also complaining of dizziness and a cramp.  Patient was seen in the ER few days ago with a similar symptom was noted to have critically low magnesium for which she was started on oral magnesium and was DC'd home.  She came back to ER again with a similar symptom with dizziness lightheadedness generalized weakness and a cramp was noted to have critically low magnesium.  She was admitted for observation on 3 S. telemetry for further care.  She denied any obvious fever or chills, no nausea vomiting or diarrhea.       Past Medical History  She has a past medical history of Breast cancer (Multi), Chemotherapy-induced peripheral neuropathy (Multi), Emphysema of lung (Multi), Failed back syndrome of cervical spine (01/08/2024), Fibromyalgia, Former smoker, GERD (gastroesophageal reflux disease), Hiatal hernia, History of cardiac murmur, antineoplastic chemo, Hyperlipidemia, Hypertension, Hypokalemia, Iron deficiency anemia, Lung cancer (Multi), Old myocardial infarction, Personal history of irradiation, Personal history of malignant neoplasm of breast, Rheumatoid arthritis, and Type 2 diabetes mellitus without complication, without long-term current use of insulin (Multi).    Surgical History  She has a past surgical history that includes Other surgical history (08/23/2013); Hysterectomy (08/23/2013); Coronary angioplasty with stent (06/20/2016); Colonoscopy (06/20/2016); Other surgical history (11/20/2021); Other surgical history (11/20/2021); Breast lumpectomy (Left, 01/18/2013); Other surgical history (03/28/2016); Other surgical history (03/28/2016); Appendectomy (03/28/2016); Cholecystectomy (03/28/2016); Cervical fusion (04/19/2016); and MR angio head wo IV contrast  (08/18/2023).     Social History  She reports that she has quit smoking. Her smoking use included cigarettes. She has never used smokeless tobacco. She reports that she does not currently use alcohol. She reports that she does not use drugs.    Family History  Family History   Problem Relation Name Age of Onset    Hypertension Mother      Coronary artery disease Mother      Migraines Mother      No Known Problems Father      Hodgkin's lymphoma Sister      Uterine cancer Sister          Allergies  Fish oil, Lipitor [atorvastatin], Wasp venom, and Zocor [simvastatin]    Current medications:    No current facility-administered medications for this encounter.    Current Outpatient Medications:     amLODIPine (Norvasc) 5 mg tablet, Take 1 tablet (5 mg) by mouth once daily., Disp: , Rfl:     fluticasone-umeclidin-vilanter (Trelegy Ellipta) 100-62.5-25 mcg blister with device, Inhale 1 puff once daily., Disp: , Rfl:     glipiZIDE XL (Glucotrol XL) 2.5 mg 24 hr tablet, Take 1 tablet (2.5 mg) by mouth once daily., Disp: , Rfl:     hydroCHLOROthiazide (HYDRODiuril) 25 mg tablet, Take 1 tablet (25 mg) by mouth once daily., Disp: , Rfl:     isosorbide mononitrate ER (Imdur) 30 mg 24 hr tablet, Take 1 tablet (30 mg) by mouth once daily., Disp: , Rfl:     losartan (Cozaar) 100 mg tablet, Take 1 tablet (100 mg) by mouth once daily., Disp: , Rfl:     magnesium oxide (Mag-Ox) 400 mg tablet, Take 1 tablet (400 mg) by mouth 2 times a day for 14 days., Disp: 28 tablet, Rfl: 0    meclizine (Antivert) 25 mg tablet, Take 1 tablet (25 mg) by mouth 3 times a day as needed for dizziness for up to 10 days., Disp: 20 tablet, Rfl: 0    metFORMIN (Glucophage) 500 mg tablet, Take 1 tablet (500 mg) by mouth 2 times a day., Disp: , Rfl:     metoprolol tartrate (Lopressor) 50 mg tablet, Take 1 tablet by mouth twice a day., Disp: , Rfl:     omeprazole (PriLOSEC) 20 mg DR capsule, Take 1 capsule (20 mg) by mouth once daily in the morning. Take before  "meals., Disp: , Rfl:     pravastatin (Pravachol) 40 mg tablet, Take 1 tablet (40 mg) by mouth every other day. Alternate with 80mg, Disp: , Rfl:     spironolactone (Aldactone) 25 mg tablet, Take 1 tablet (25 mg) by mouth once daily., Disp: , Rfl:     [START ON 3/8/2025] cetirizine (ZyrTEC) 10 mg tablet, Take 1 tablet (10 mg) by mouth once daily., Disp: 30 tablet, Rfl: 0    [START ON 3/8/2025] fluticasone (Flonase) 50 mcg/actuation nasal spray, Administer 2 sprays into each nostril once daily. Shake gently. Before first use, prime pump. After use, clean tip and replace cap., Disp: 16 g, Rfl: 12    HYDROcodone-acetaminophen (Norco) 5-325 mg tablet, Take 1 tablet by mouth every 6 hours if needed (pain)., Disp: , Rfl:     nitroglycerin (Nitrostat) 0.4 mg SL tablet, Place 1 tablet (0.4 mg) under the tongue every 5 minutes if needed for chest pain., Disp: , Rfl:     pravastatin (Pravachol) 80 mg tablet, Take 1 tablet (80 mg) by mouth once daily. Alternate with 40mg, Disp: , Rfl:        Review of Systems     10 organ ROS is pertinent for history mentioned above, otherwise negative      Physical Exam  HENT:      Head: Normocephalic.   Eyes:      Conjunctiva/sclera: Conjunctivae normal.   Cardiovascular:      Rate and Rhythm: Regular rhythm.   Pulmonary:      Breath sounds: Normal breath sounds.   Abdominal:      General: Bowel sounds are normal.      Palpations: Abdomen is soft.   Musculoskeletal:      Cervical back: Neck supple.   Neurological:      Mental Status: She is alert.          Last Recorded Vitals      Blood pressure (!) 93/46, pulse 69, temperature 36.2 °C (97.2 °F), temperature source Temporal, resp. rate 18, height 1.6 m (5' 3\"), weight 86.3 kg (190 lb 4.1 oz), SpO2 93%.    Relevant Results     Results for orders placed or performed during the hospital encounter of 03/07/25 (from the past 24 hours)   CBC and Auto Differential   Result Value Ref Range    WBC 6.0 4.4 - 11.3 x10*3/uL    nRBC 0.0 0.0 - 0.0 /100 " WBCs    RBC 3.19 (L) 4.00 - 5.20 x10*6/uL    Hemoglobin 9.4 (L) 12.0 - 16.0 g/dL    Hematocrit 30.4 (L) 36.0 - 46.0 %    MCV 95 80 - 100 fL    MCH 29.5 26.0 - 34.0 pg    MCHC 30.9 (L) 32.0 - 36.0 g/dL    RDW 13.8 11.5 - 14.5 %    Platelets 213 150 - 450 x10*3/uL    Neutrophils % 50.6 40.0 - 80.0 %    Immature Granulocytes %, Automated 0.2 0.0 - 0.9 %    Lymphocytes % 33.8 13.0 - 44.0 %    Monocytes % 12.4 2.0 - 10.0 %    Eosinophils % 2.5 0.0 - 6.0 %    Basophils % 0.5 0.0 - 2.0 %    Neutrophils Absolute 3.01 1.60 - 5.50 x10*3/uL    Immature Granulocytes Absolute, Automated 0.01 0.00 - 0.50 x10*3/uL    Lymphocytes Absolute 2.01 0.80 - 3.00 x10*3/uL    Monocytes Absolute 0.74 0.05 - 0.80 x10*3/uL    Eosinophils Absolute 0.15 0.00 - 0.40 x10*3/uL    Basophils Absolute 0.03 0.00 - 0.10 x10*3/uL   Magnesium   Result Value Ref Range    Magnesium 1.48 (L) 1.60 - 2.40 mg/dL   Comprehensive metabolic panel   Result Value Ref Range    Glucose 125 (H) 74 - 99 mg/dL    Sodium 134 (L) 136 - 145 mmol/L    Potassium 4.6 3.5 - 5.3 mmol/L    Chloride 100 98 - 107 mmol/L    Bicarbonate 24 21 - 32 mmol/L    Anion Gap 15 10 - 20 mmol/L    Urea Nitrogen 18 6 - 23 mg/dL    Creatinine 1.09 (H) 0.50 - 1.05 mg/dL    eGFR 53 (L) >60 mL/min/1.73m*2    Calcium 9.1 8.6 - 10.3 mg/dL    Albumin 4.3 3.4 - 5.0 g/dL    Alkaline Phosphatase 85 33 - 136 U/L    Total Protein 6.8 6.4 - 8.2 g/dL    AST 28 9 - 39 U/L    Bilirubin, Total 0.2 0.0 - 1.2 mg/dL    ALT 17 7 - 45 U/L   Troponin I, High Sensitivity, Initial   Result Value Ref Range    Troponin I, High Sensitivity 8 0 - 13 ng/L   Phosphorus   Result Value Ref Range    Phosphorus 3.7 2.5 - 4.9 mg/dL   Influenza A, and B PCR   Result Value Ref Range    Flu A Result Not Detected Not Detected    Flu B Result Not Detected Not Detected   Sars-CoV-2 PCR   Result Value Ref Range    Coronavirus 2019, PCR Not Detected Not Detected   Troponin, High Sensitivity, 1 Hour   Result Value Ref Range    Troponin I,  High Sensitivity 7 0 - 13 ng/L   Basic metabolic panel   Result Value Ref Range    Glucose 134 (H) 74 - 99 mg/dL    Sodium 136 136 - 145 mmol/L    Potassium 4.7 3.5 - 5.3 mmol/L    Chloride 102 98 - 107 mmol/L    Bicarbonate 25 21 - 32 mmol/L    Anion Gap 14 10 - 20 mmol/L    Urea Nitrogen 18 6 - 23 mg/dL    Creatinine 1.01 0.50 - 1.05 mg/dL    eGFR 58 (L) >60 mL/min/1.73m*2    Calcium 8.7 8.6 - 10.3 mg/dL   CBC   Result Value Ref Range    WBC 4.7 4.4 - 11.3 x10*3/uL    nRBC 0.0 0.0 - 0.0 /100 WBCs    RBC 3.03 (L) 4.00 - 5.20 x10*6/uL    Hemoglobin 9.0 (L) 12.0 - 16.0 g/dL    Hematocrit 29.7 (L) 36.0 - 46.0 %    MCV 98 80 - 100 fL    MCH 29.7 26.0 - 34.0 pg    MCHC 30.3 (L) 32.0 - 36.0 g/dL    RDW 13.8 11.5 - 14.5 %    Platelets 186 150 - 450 x10*3/uL   Magnesium   Result Value Ref Range    Magnesium 2.22 1.60 - 2.40 mg/dL   POCT GLUCOSE   Result Value Ref Range    POCT Glucose 139 (H) 74 - 99 mg/dL   POCT GLUCOSE   Result Value Ref Range    POCT Glucose 140 (H) 74 - 99 mg/dL          Radiology  CT HEAD WO IV CONTRAST;  3/7/2025 2:16 am      INDICATION:  Signs/Symptoms:Dizziness.      COMPARISON:  02/20/2025      ACCESSION NUMBER(S):  [id number]      ORDERING CLINICIAN:  DUSTIN BUSBY      TECHNIQUE:  Axial noncontrast CT images of the head.      FINDINGS:  BRAIN PARENCHYMA: Mild deep and periventricular white matter  hypodensities are nonspecific, but favored to represent chronic small  vessel ischemic changes.  Gray-white matter interfaces are preserved.  No mass effect or midline shift.      HEMORRHAGE: No acute intracranial hemorrhage.  VENTRICLES and EXTRA-AXIAL SPACES: The ventricles and sulci are  within normal limits in size for brain volume. No abnormal extraaxial  fluid collection. EXTRACRANIAL SOFT TISSUES: Within normal limits.  PARANASAL SINUSES/MASTOIDS:  The visualized paranasal sinuses and  mastoid air cells are aerated. CALVARIUM: No depressed skull  fracture. No destructive osseous lesion.       OTHER FINDINGS: None.      IMPRESSION:  No acute intracranial abnormality.               Assessment/Plan   Assessment & Plan  Hypomagnesemia     Nasal sinus congestion     Dizzinesses    #Lightheadedness           PLAN: Patient was admitted on 3 S. daily, was given IV mag bolus and was started on oral magnesium, check orthostatic BP for lightheadedness and dizziness was started on Antivert, med list and labs reviewed, for now continue with current Rx, discussed with nursing and CNP, follow closely.            Dash Sommer MD

## 2025-03-07 NOTE — PROGRESS NOTES
03/07/25 1142   Discharge Planning   Living Arrangements Alone   Support Systems Family members   Assistance Needed denies   Type of Residence Private residence   Home or Post Acute Services None   Expected Discharge Disposition Home   Does the patient need discharge transport arranged? No   Financial Resource Strain   How hard is it for you to pay for the very basics like food, housing, medical care, and heating? Not hard   Housing Stability   In the last 12 months, was there a time when you were not able to pay the mortgage or rent on time? N   At any time in the past 12 months, were you homeless or living in a shelter (including now)? N   Transportation Needs   In the past 12 months, has lack of transportation kept you from medical appointments or from getting medications? no   In the past 12 months, has lack of transportation kept you from meetings, work, or from getting things needed for daily living? No   Patient Choice   Patient / Family choosing to utilize agency / facility established prior to hospitalization No   Stroke Family Assessment   Stroke Family Assessment Needed No   Intensity of Service   Intensity of Service 0-30 min     Met with patient at the bedside, confirmed demographics, insurance, and pcp is Dr. Jaffe. She lives alone and is independent with care needs. Her family is available for additional support if needed.  She is not driving at this time due to leg cramps, but family is available to assist. She denies any financial concerns. She is able to purchase her medications, received education, and takes as ordered. Patient plans to discharge home with no additional services.

## 2025-03-07 NOTE — ASSESSMENT & PLAN NOTE
#Lightheadedness  -CT head: negative for intracranial abnormality  -Patient states she was given a prescription for meclizine when she was in the ER last week, however this has not helped relieve her dizziness/lightheadedness  -Likely multifactorial, associated with hypomagnesemia and nasal sinus congestion as mentioned above  -Obtain orthostatic vital signs to rule out significant changes in BP or HR  -Encourage oral hydration   -Can consider neurology consult if symptoms do not resolve

## 2025-03-12 ENCOUNTER — HOSPITAL ENCOUNTER (OUTPATIENT)
Dept: RADIOLOGY | Facility: HOSPITAL | Age: 75
Discharge: HOME | End: 2025-03-12
Payer: MEDICARE

## 2025-03-12 DIAGNOSIS — Z17.0 MALIGNANT NEOPLASM OF LOWER-OUTER QUADRANT OF LEFT BREAST OF FEMALE, ESTROGEN RECEPTOR POSITIVE: ICD-10-CM

## 2025-03-12 DIAGNOSIS — C50.512 MALIGNANT NEOPLASM OF LOWER-OUTER QUADRANT OF LEFT BREAST OF FEMALE, ESTROGEN RECEPTOR POSITIVE: ICD-10-CM

## 2025-03-12 PROCEDURE — 2550000001 HC RX 255 CONTRASTS: Performed by: INTERNAL MEDICINE

## 2025-03-12 PROCEDURE — 70470 CT HEAD/BRAIN W/O & W/DYE: CPT

## 2025-03-12 PROCEDURE — 71260 CT THORAX DX C+: CPT

## 2025-03-12 RX ADMIN — IOHEXOL 60 ML: 350 INJECTION, SOLUTION INTRAVENOUS at 18:47

## 2025-03-14 ENCOUNTER — APPOINTMENT (OUTPATIENT)
Dept: RADIOLOGY | Facility: HOSPITAL | Age: 75
End: 2025-03-14
Payer: MEDICARE

## 2025-03-14 ENCOUNTER — OFFICE VISIT (OUTPATIENT)
Dept: HEMATOLOGY/ONCOLOGY | Facility: CLINIC | Age: 75
End: 2025-03-14
Payer: MEDICARE

## 2025-03-14 ENCOUNTER — LAB (OUTPATIENT)
Dept: LAB | Facility: CLINIC | Age: 75
End: 2025-03-14
Payer: MEDICARE

## 2025-03-14 VITALS
SYSTOLIC BLOOD PRESSURE: 133 MMHG | RESPIRATION RATE: 18 BRPM | HEART RATE: 68 BPM | OXYGEN SATURATION: 94 % | WEIGHT: 181.66 LBS | DIASTOLIC BLOOD PRESSURE: 70 MMHG | BODY MASS INDEX: 32.18 KG/M2 | TEMPERATURE: 96.8 F

## 2025-03-14 DIAGNOSIS — Z17.0 MALIGNANT NEOPLASM OF CENTRAL PORTION OF RIGHT BREAST IN FEMALE, ESTROGEN RECEPTOR POSITIVE: ICD-10-CM

## 2025-03-14 DIAGNOSIS — M19.91 PRIMARY OSTEOARTHRITIS, UNSPECIFIED SITE: ICD-10-CM

## 2025-03-14 DIAGNOSIS — C34.11 MALIGNANT NEOPLASM OF UPPER LOBE OF RIGHT LUNG (MULTI): Primary | ICD-10-CM

## 2025-03-14 DIAGNOSIS — C50.512 MALIGNANT NEOPLASM OF LOWER-OUTER QUADRANT OF LEFT BREAST OF FEMALE, ESTROGEN RECEPTOR POSITIVE: ICD-10-CM

## 2025-03-14 DIAGNOSIS — I25.10 ARTERIOSCLEROSIS OF CORONARY ARTERY: ICD-10-CM

## 2025-03-14 DIAGNOSIS — K21.00 GASTROESOPHAGEAL REFLUX DISEASE WITH ESOPHAGITIS WITHOUT HEMORRHAGE: ICD-10-CM

## 2025-03-14 DIAGNOSIS — I10 HYPERTENSION, UNSPECIFIED TYPE: ICD-10-CM

## 2025-03-14 DIAGNOSIS — Z17.0 MALIGNANT NEOPLASM OF LOWER-OUTER QUADRANT OF LEFT BREAST OF FEMALE, ESTROGEN RECEPTOR POSITIVE: ICD-10-CM

## 2025-03-14 DIAGNOSIS — J44.9 CHRONIC OBSTRUCTIVE PULMONARY DISEASE, UNSPECIFIED COPD TYPE (MULTI): ICD-10-CM

## 2025-03-14 DIAGNOSIS — D64.9 ANEMIA, UNSPECIFIED TYPE: ICD-10-CM

## 2025-03-14 DIAGNOSIS — C50.111 MALIGNANT NEOPLASM OF CENTRAL PORTION OF RIGHT BREAST IN FEMALE, ESTROGEN RECEPTOR POSITIVE: ICD-10-CM

## 2025-03-14 DIAGNOSIS — E78.5 HYPERLIPIDEMIA, UNSPECIFIED HYPERLIPIDEMIA TYPE: ICD-10-CM

## 2025-03-14 DIAGNOSIS — E11.9 TYPE 2 DIABETES MELLITUS WITHOUT COMPLICATION, WITHOUT LONG-TERM CURRENT USE OF INSULIN (MULTI): ICD-10-CM

## 2025-03-14 LAB
ALBUMIN SERPL BCP-MCNC: 4.6 G/DL (ref 3.4–5)
ALP SERPL-CCNC: 68 U/L (ref 33–136)
ALT SERPL W P-5'-P-CCNC: 19 U/L (ref 7–45)
ANION GAP SERPL CALC-SCNC: 14 MMOL/L (ref 10–20)
AST SERPL W P-5'-P-CCNC: 28 U/L (ref 9–39)
BASOPHILS # BLD AUTO: 0.03 X10*3/UL (ref 0–0.1)
BASOPHILS NFR BLD AUTO: 0.6 %
BILIRUB SERPL-MCNC: 0.4 MG/DL (ref 0–1.2)
BUN SERPL-MCNC: 21 MG/DL (ref 6–23)
CALCIUM SERPL-MCNC: 9.2 MG/DL (ref 8.6–10.3)
CEA SERPL-MCNC: 1.7 UG/L
CHLORIDE SERPL-SCNC: 99 MMOL/L (ref 98–107)
CO2 SERPL-SCNC: 26 MMOL/L (ref 21–32)
CREAT SERPL-MCNC: 1.15 MG/DL (ref 0.5–1.05)
EGFRCR SERPLBLD CKD-EPI 2021: 50 ML/MIN/1.73M*2
EOSINOPHIL # BLD AUTO: 0.19 X10*3/UL (ref 0–0.4)
EOSINOPHIL NFR BLD AUTO: 3.8 %
ERYTHROCYTE [DISTWIDTH] IN BLOOD BY AUTOMATED COUNT: 14.1 % (ref 11.5–14.5)
GLUCOSE SERPL-MCNC: 148 MG/DL (ref 74–99)
HCT VFR BLD AUTO: 31.4 % (ref 36–46)
HGB BLD-MCNC: 9.9 G/DL (ref 12–16)
IMM GRANULOCYTES # BLD AUTO: 0.03 X10*3/UL (ref 0–0.5)
IMM GRANULOCYTES NFR BLD AUTO: 0.6 % (ref 0–0.9)
LYMPHOCYTES # BLD AUTO: 1.61 X10*3/UL (ref 0.8–3)
LYMPHOCYTES NFR BLD AUTO: 32.2 %
MCH RBC QN AUTO: 30.2 PG (ref 26–34)
MCHC RBC AUTO-ENTMCNC: 31.5 G/DL (ref 32–36)
MCV RBC AUTO: 96 FL (ref 80–100)
MONOCYTES # BLD AUTO: 0.43 X10*3/UL (ref 0.05–0.8)
MONOCYTES NFR BLD AUTO: 8.6 %
NEUTROPHILS # BLD AUTO: 2.71 X10*3/UL (ref 1.6–5.5)
NEUTROPHILS NFR BLD AUTO: 54.2 %
PLATELET # BLD AUTO: 256 X10*3/UL (ref 150–450)
POTASSIUM SERPL-SCNC: 4.9 MMOL/L (ref 3.5–5.3)
PROT SERPL-MCNC: 6.9 G/DL (ref 6.4–8.2)
RBC # BLD AUTO: 3.28 X10*6/UL (ref 4–5.2)
SODIUM SERPL-SCNC: 134 MMOL/L (ref 136–145)
WBC # BLD AUTO: 5 X10*3/UL (ref 4.4–11.3)

## 2025-03-14 PROCEDURE — 1125F AMNT PAIN NOTED PAIN PRSNT: CPT | Performed by: INTERNAL MEDICINE

## 2025-03-14 PROCEDURE — 1160F RVW MEDS BY RX/DR IN RCRD: CPT | Performed by: INTERNAL MEDICINE

## 2025-03-14 PROCEDURE — 36415 COLL VENOUS BLD VENIPUNCTURE: CPT

## 2025-03-14 PROCEDURE — 3075F SYST BP GE 130 - 139MM HG: CPT | Performed by: INTERNAL MEDICINE

## 2025-03-14 PROCEDURE — 85025 COMPLETE CBC W/AUTO DIFF WBC: CPT

## 2025-03-14 PROCEDURE — 3078F DIAST BP <80 MM HG: CPT | Performed by: INTERNAL MEDICINE

## 2025-03-14 PROCEDURE — 82378 CARCINOEMBRYONIC ANTIGEN: CPT

## 2025-03-14 PROCEDURE — 84075 ASSAY ALKALINE PHOSPHATASE: CPT

## 2025-03-14 PROCEDURE — 99214 OFFICE O/P EST MOD 30 MIN: CPT | Performed by: INTERNAL MEDICINE

## 2025-03-14 PROCEDURE — G2211 COMPLEX E/M VISIT ADD ON: HCPCS | Performed by: INTERNAL MEDICINE

## 2025-03-14 PROCEDURE — 4010F ACE/ARB THERAPY RXD/TAKEN: CPT | Performed by: INTERNAL MEDICINE

## 2025-03-14 PROCEDURE — 1159F MED LIST DOCD IN RCRD: CPT | Performed by: INTERNAL MEDICINE

## 2025-03-14 ASSESSMENT — PAIN SCALES - GENERAL: PAINLEVEL_OUTOF10: 4

## 2025-03-14 NOTE — PROGRESS NOTES
Patient ID: Lisa Andrade is a 74 y.o. female.  Referring Physician: Silvano Chaudhary MD  [id number] [facility] [address]  [place] [address]  Primary Care Provider: Quentin Jaffe MD  Visit Type: Follow Up      Subjective    HPI How was my CT scan?    Review of Systems   Constitutional: Negative.    HENT:  Negative.     Eyes: Negative.    Respiratory: Negative.     Cardiovascular: Negative.    Gastrointestinal: Negative.    Endocrine: Negative.    Genitourinary: Negative.     Musculoskeletal: Negative.    Skin: Negative.    Neurological: Negative.    Hematological: Negative.    Psychiatric/Behavioral: Negative.          Objective   BSA: 1.91 meters squared  /70 (BP Location: Right arm)   Pulse 68   Temp 36 °C (96.8 °F) (Temporal)   Resp 18   Wt 82.4 kg (181 lb 10.5 oz)   SpO2 94%   BMI 32.18 kg/m²      has a past medical history of Breast cancer (Multi), Chemotherapy-induced peripheral neuropathy (Multi), Emphysema of lung (Multi), Failed back syndrome of cervical spine (01/08/2024), Fibromyalgia, Former smoker, GERD (gastroesophageal reflux disease), Hiatal hernia, History of cardiac murmur, antineoplastic chemo, Hyperlipidemia, Hypertension, Hypokalemia, Iron deficiency anemia, Lung cancer (Multi), Old myocardial infarction, Personal history of irradiation, Personal history of malignant neoplasm of breast, Rheumatoid arthritis, and Type 2 diabetes mellitus without complication, without long-term current use of insulin (Multi).   has a past surgical history that includes Other surgical history (08/23/2013); Hysterectomy (08/23/2013); Coronary angioplasty with stent (06/20/2016); Colonoscopy (06/20/2016); Other surgical history (11/20/2021); Other surgical history (11/20/2021); Breast lumpectomy (Left, 01/18/2013); Other surgical history (03/28/2016); Other surgical history (03/28/2016); Appendectomy (03/28/2016); Cholecystectomy (03/28/2016); Cervical fusion (04/19/2016); and MR angio head wo IV  contrast (08/18/2023).  Family History   Problem Relation Name Age of Onset    Hypertension Mother      Coronary artery disease Mother      Migraines Mother      No Known Problems Father      Hodgkin's lymphoma Sister      Uterine cancer Sister       Oncology History    No history exists.       Lisa Andrade  reports that she has quit smoking. Her smoking use included cigarettes. She has never used smokeless tobacco.  She  reports that she does not currently use alcohol.  She  reports no history of drug use.    Physical Exam  Vitals reviewed.   Constitutional:       Appearance: Normal appearance.   HENT:      Head: Normocephalic.      Mouth/Throat:      Mouth: Mucous membranes are moist.   Eyes:      Extraocular Movements: Extraocular movements intact.      Pupils: Pupils are equal, round, and reactive to light.   Cardiovascular:      Rate and Rhythm: Normal rate and regular rhythm.      Pulses: Normal pulses.      Heart sounds: Normal heart sounds.   Pulmonary:      Effort: Pulmonary effort is normal.      Breath sounds: Normal breath sounds.   Abdominal:      General: Bowel sounds are normal.      Palpations: Abdomen is soft.   Musculoskeletal:         General: Normal range of motion.      Cervical back: Normal range of motion and neck supple.   Skin:     General: Skin is warm.   Neurological:      General: No focal deficit present.      Mental Status: She is alert and oriented to person, place, and time.   Psychiatric:         Mood and Affect: Mood normal.         Behavior: Behavior normal.         WBC   Date/Time Value Ref Range Status   03/14/2025 09:00 AM 5.0 4.4 - 11.3 x10*3/uL Final   03/07/2025 06:14 AM 4.7 4.4 - 11.3 x10*3/uL Final   03/07/2025 01:07 AM 6.0 4.4 - 11.3 x10*3/uL Final     nRBC   Date Value Ref Range Status   03/07/2025 0.0 0.0 - 0.0 /100 WBCs Final   03/07/2025 0.0 0.0 - 0.0 /100 WBCs Final   03/02/2025 0.0 0.0 - 0.0 /100 WBCs Final     RBC   Date Value Ref Range Status   03/14/2025 3.28  "(L) 4.00 - 5.20 x10*6/uL Final   03/07/2025 3.03 (L) 4.00 - 5.20 x10*6/uL Final   03/07/2025 3.19 (L) 4.00 - 5.20 x10*6/uL Final     Hemoglobin   Date Value Ref Range Status   03/14/2025 9.9 (L) 12.0 - 16.0 g/dL Final   03/07/2025 9.0 (L) 12.0 - 16.0 g/dL Final   03/07/2025 9.4 (L) 12.0 - 16.0 g/dL Final     Hematocrit   Date Value Ref Range Status   03/14/2025 31.4 (L) 36.0 - 46.0 % Final   03/07/2025 29.7 (L) 36.0 - 46.0 % Final   03/07/2025 30.4 (L) 36.0 - 46.0 % Final     MCV   Date/Time Value Ref Range Status   03/14/2025 09:00 AM 96 80 - 100 fL Final   03/07/2025 06:14 AM 98 80 - 100 fL Final   03/07/2025 01:07 AM 95 80 - 100 fL Final     MCH   Date/Time Value Ref Range Status   03/14/2025 09:00 AM 30.2 26.0 - 34.0 pg Final   03/07/2025 06:14 AM 29.7 26.0 - 34.0 pg Final   03/07/2025 01:07 AM 29.5 26.0 - 34.0 pg Final     MCHC   Date/Time Value Ref Range Status   03/14/2025 09:00 AM 31.5 (L) 32.0 - 36.0 g/dL Final   03/07/2025 06:14 AM 30.3 (L) 32.0 - 36.0 g/dL Final   03/07/2025 01:07 AM 30.9 (L) 32.0 - 36.0 g/dL Final     RDW   Date/Time Value Ref Range Status   03/14/2025 09:00 AM 14.1 11.5 - 14.5 % Final   03/07/2025 06:14 AM 13.8 11.5 - 14.5 % Final   03/07/2025 01:07 AM 13.8 11.5 - 14.5 % Final     Platelets   Date/Time Value Ref Range Status   03/14/2025 09:00  150 - 450 x10*3/uL Final   03/07/2025 06:14  150 - 450 x10*3/uL Final   03/07/2025 01:07  150 - 450 x10*3/uL Final     No results found for: \"MPV\"  Neutrophils %   Date/Time Value Ref Range Status   03/14/2025 09:00 AM 54.2 40.0 - 80.0 % Final   03/07/2025 01:07 AM 50.6 40.0 - 80.0 % Final   03/02/2025 03:10 AM 55.3 40.0 - 80.0 % Final     Immature Granulocytes %, Automated   Date/Time Value Ref Range Status   03/14/2025 09:00 AM 0.6 0.0 - 0.9 % Final     Comment:     Immature Granulocyte Count (IG) includes promyelocytes, myelocytes and metamyelocytes but does not include bands. Percent differential counts (%) should be " interpreted in the context of the absolute cell counts (cells/UL).   03/07/2025 01:07 AM 0.2 0.0 - 0.9 % Final     Comment:     Immature Granulocyte Count (IG) includes promyelocytes, myelocytes and metamyelocytes but does not include bands. Percent differential counts (%) should be interpreted in the context of the absolute cell counts (cells/UL).   03/02/2025 03:10 AM 0.3 0.0 - 0.9 % Final     Comment:     Immature Granulocyte Count (IG) includes promyelocytes, myelocytes and metamyelocytes but does not include bands. Percent differential counts (%) should be interpreted in the context of the absolute cell counts (cells/UL).     Lymphocytes %   Date/Time Value Ref Range Status   03/14/2025 09:00 AM 32.2 13.0 - 44.0 % Final   03/07/2025 01:07 AM 33.8 13.0 - 44.0 % Final   03/02/2025 03:10 AM 32.6 13.0 - 44.0 % Final     Monocytes %   Date/Time Value Ref Range Status   03/14/2025 09:00 AM 8.6 2.0 - 10.0 % Final   03/07/2025 01:07 AM 12.4 2.0 - 10.0 % Final   03/02/2025 03:10 AM 9.8 2.0 - 10.0 % Final     Eosinophils %   Date/Time Value Ref Range Status   03/14/2025 09:00 AM 3.8 0.0 - 6.0 % Final   03/07/2025 01:07 AM 2.5 0.0 - 6.0 % Final   03/02/2025 03:10 AM 1.5 0.0 - 6.0 % Final     Basophils %   Date/Time Value Ref Range Status   03/14/2025 09:00 AM 0.6 0.0 - 2.0 % Final   03/07/2025 01:07 AM 0.5 0.0 - 2.0 % Final   03/02/2025 03:10 AM 0.5 0.0 - 2.0 % Final     Neutrophils Absolute   Date/Time Value Ref Range Status   03/14/2025 09:00 AM 2.71 1.60 - 5.50 x10*3/uL Final     Comment:     Percent differential counts (%) should be interpreted in the context of the absolute cell counts (cells/uL).   03/07/2025 01:07 AM 3.01 1.60 - 5.50 x10*3/uL Final     Comment:     Percent differential counts (%) should be interpreted in the context of the absolute cell counts (cells/uL).   03/02/2025 03:10 AM 3.61 1.60 - 5.50 x10*3/uL Final     Comment:     Percent differential counts (%) should be interpreted in the context of  "the absolute cell counts (cells/uL).     Immature Granulocytes Absolute, Automated   Date/Time Value Ref Range Status   03/14/2025 09:00 AM 0.03 0.00 - 0.50 x10*3/uL Final   03/07/2025 01:07 AM 0.01 0.00 - 0.50 x10*3/uL Final   03/02/2025 03:10 AM 0.02 0.00 - 0.50 x10*3/uL Final     Lymphocytes Absolute   Date/Time Value Ref Range Status   03/14/2025 09:00 AM 1.61 0.80 - 3.00 x10*3/uL Final   03/07/2025 01:07 AM 2.01 0.80 - 3.00 x10*3/uL Final   03/02/2025 03:10 AM 2.13 0.80 - 3.00 x10*3/uL Final     Monocytes Absolute   Date/Time Value Ref Range Status   03/14/2025 09:00 AM 0.43 0.05 - 0.80 x10*3/uL Final   03/07/2025 01:07 AM 0.74 0.05 - 0.80 x10*3/uL Final   03/02/2025 03:10 AM 0.64 0.05 - 0.80 x10*3/uL Final     Eosinophils Absolute   Date/Time Value Ref Range Status   03/14/2025 09:00 AM 0.19 0.00 - 0.40 x10*3/uL Final   03/07/2025 01:07 AM 0.15 0.00 - 0.40 x10*3/uL Final   03/02/2025 03:10 AM 0.10 0.00 - 0.40 x10*3/uL Final     Basophils Absolute   Date/Time Value Ref Range Status   03/14/2025 09:00 AM 0.03 0.00 - 0.10 x10*3/uL Final   03/07/2025 01:07 AM 0.03 0.00 - 0.10 x10*3/uL Final   03/02/2025 03:10 AM 0.03 0.00 - 0.10 x10*3/uL Final       No components found for: \"PT\"  aPTT   Date/Time Value Ref Range Status   09/07/2021 06:18 AM 27 25 - 35 sec Final     Comment:       THE APTT IS NO LONGER USED FOR MONITORING     UNFRACTIONATED HEPARIN THERAPY.    FOR MONITORING HEPARIN THERAPY,     USE THE HEPARIN ASSAY.       Medication Documentation Review Audit       Reviewed by Chen Hercules MA (Medical Assistant) on 03/14/25 at 0857      Medication Order Taking? Sig Documenting Provider Last Dose Status   amLODIPine (Norvasc) 5 mg tablet [id number] Yes Take 1 tablet (5 mg) by mouth once daily. Historical Provider, MD 3/6/2025 Active   cetirizine (ZyrTEC) 10 mg tablet [id number] Yes Take 1 tablet (10 mg) by mouth once daily. Dash Sommer MD  Active   fluticasone (Flonase) 50 mcg/actuation nasal spray " [id number] Yes Administer 2 sprays into each nostril once daily. Shake gently. Before first use, prime pump. After use, clean tip and replace cap. Dash Sommer MD  Active   fluticasone-umeclidin-vilanter (Trelegy Ellipta) 100-62.5-25 mcg blister with device [id number] Yes Inhale 1 puff once daily. Historical Provider, MD 3/6/2025 Active   glipiZIDE XL (Glucotrol XL) 2.5 mg 24 hr tablet [id number] Yes Take 1 tablet (2.5 mg) by mouth once daily. Clover Provider, MD 3/6/2025 Active   hydroCHLOROthiazide (HYDRODiuril) 25 mg tablet [id number] Yes Take 1 tablet (25 mg) by mouth once daily. Clover Provider, MD 3/6/2025 Active   HYDROcodone-acetaminophen (Norco) 5-325 mg tablet [id number] Yes Take 1 tablet by mouth every 6 hours if needed (pain). Historical Provider, MD Unknown Active   isosorbide mononitrate ER (Imdur) 30 mg 24 hr tablet [id number] Yes Take 1 tablet (30 mg) by mouth once daily. Clover Provider, MD 3/6/2025 Active   losartan (Cozaar) 100 mg tablet [id number] Yes Take 1 tablet (100 mg) by mouth once daily. Clover Provider, MD 3/6/2025 Active   magnesium oxide (Mag-Ox) 400 mg tablet [id number] Yes Take 1 tablet (400 mg) by mouth 2 times a day for 14 days. Erik Trujillo DO 3/6/2025 Active   meclizine (Antivert) 25 mg tablet [id number] No Take 1 tablet (25 mg) by mouth 3 times a day as needed for dizziness for up to 10 days. Erik Trujillo DO Past Week  25 2359   metFORMIN (Glucophage) 500 mg tablet [id number] Yes Take 1 tablet (500 mg) by mouth 2 times a day. Clover Palacios MD 3/6/2025 Active   metoprolol tartrate (Lopressor) 50 mg tablet [id number] Yes Take 1 tablet by mouth twice a day. Clover Provider, MD 3/6/2025 Active   nitroglycerin (Nitrostat) 0.4 mg SL tablet [id number] Yes Place 1 tablet (0.4 mg) under the tongue every 5 minutes if needed for chest pain. Historical Provider, MD Unknown Active   omeprazole (PriLOSEC) 20 mg DR capsule [id number] Yes Take 1 capsule (20 mg) by  mouth once daily in the morning. Take before meals. Historical Provider, MD 3/6/2025 Active   pravastatin (Pravachol) 40 mg tablet [id number] Yes Take 1 tablet (40 mg) by mouth every other day. Alternate with 80mg Historical Provider, MD 3/6/2025 Active   pravastatin (Pravachol) 80 mg tablet [id number] Yes Take 1 tablet (80 mg) by mouth once daily. Alternate with 40mg Historical Provider, MD 3/5/2025 Morning Active   spironolactone (Aldactone) 25 mg tablet [id number] Yes Take 1 tablet (25 mg) by mouth once daily. Historical Provider, MD 3/6/2025 Active                   Assessment/Plan    1) small cell lung cancer  -limited stage, was treated with chemoradiation  -completed 4 cycles of carboplatin + etoposide by 1/2022  -here for interval followup  -labs done on 9/6/2024 included CBC, COMP, iron panel + ferritin, CEA  -results reviewed--wbc 4.9, hgb 10.8, plt 219,000, creatinine 1.27, calcium 8.4, alk phos 68, AST 27, ALT 20, CEA 1.6, TIBC 415, sat 21%, ferritin 29  -CT chest done on 2/20/2024 reviewed- no evidence of thoracic lymphadenopathy; reticular and bandlike opacities are again seen in the medial aspect of the right upper lobe; 4.3 mm ground glass nodule in the right upper lobe; 4.7 mm ground glass nodule is seen in the right lower lobe;  4.5 mm unchanged subpleural nodule in RLL; 4 mm nodule in LLL unchanged; 2-3 mm nodule in right lobe unchanged; redemonstration of post treatment changes in the left breast  -here for interval followup-says neuropathy in feet getting worse despite taking gabapentin  -has tried cymbalta and lyrica before--for fibromyalgia--and they never helped; she is not interested in titrating up gabapentin  -CT chest done on 9/6/2024 reviewed--no endobronchial lesion; stable appearance of bandlike opacity with associated traction bronchiectasis and volume loss in the medial right upper lobe compatible with postradiation fibrosis; moderate upper lobe predominant centrilobular and paraseptal   emphysema; no focal consolidation, pleural effusion, or pneumothorax; stable 0.4 cm LLL pulmonary nodule; stable 0.4 cm LLL nodule  -next chest CT due in 6 months  -she is also due for followup brain imaging--she does not want to do MRI--so will do head CT  -she wishes to try the neuropathy gel that is compounded by [facility]  -here for interval followup  -labs done on 3/14/2025 included CBC + COMP + CEA  -results reviewed--wbc 5.0, hgb 9.9, plt 256,000, creatinine 1.15, calcium 9.2, alk phos 68, AST 28, total bili 0.4, ALT 19, CEA 1.7  -CT head done on 3/7/2025 reviewed--no acute intracranial abnormality  -CT chest done on 3/12/2025 reviewed--central pulmonary arteries are normal in course and caliber without filling defects to suggest PE; no mediastinal, hilar or axillary lymphadenopathy; linear bandlike opacity is again seen in right upper lobe medially with associated traction bronchiectasis most likely related to postradiation fibrosis; redemonstration of centrilobular emphysema; small nodule 4 mm again seen in RLL; smaller nodule measuring 3.5 mm is also seen in LLL stable; no new suspicious lung lesions are identified; no focal hepatic lesions seen; postsurgical changes are again seen in left breast  -will continue to see her Q6 months  -next head and chest CT due in 1 year        2) breast cancer  -h/o stage IA ER+ CO+ sky7pve neg breast cancer, T1bN0, intermediate Oncotype Dx score  -completed TC x 4  -s/p XRT  -currently on arimidex--completed course of endocrine therapy  -Ca 27.9 done on 2/19/2024 was 34.7     3) hypertension  -on amlodipine  -on losartan  -on metoprolol  -on HCTZ     4) CAD  -s/p cardiac cath with stent in 2007  -on ASA     5) diabetes  -on glipizide  -on metformin     6) arthritis  -on celebrex  -on plaquenil     7) GERD  -on omeprazole     8) COPD  -on combivent     9) hyperlipidemia  -on pravastatin     Problem List Items Addressed This Visit             ICD-10-CM    Malignant neoplasm  of lower-outer quadrant of left female breast C50.512    Relevant Orders    Clinic Appointment Request Follow Up; SILVANO CHAUDHARY; [id number]    CBC and Auto Differential    Comprehensive metabolic panel    CEA    Malignant neoplasm of central portion of right breast in female, estrogen receptor positive - Primary C50.111, Z17.0    Relevant Orders    Cancer Antigen 27-29     Other Visit Diagnoses         Codes    Anemia, unspecified type     D64.9    Relevant Orders    Iron and TIBC    Ferritin                 Silvano Chaudhary MD

## 2025-03-16 ASSESSMENT — ENCOUNTER SYMPTOMS
ENDOCRINE NEGATIVE: 1
NEUROLOGICAL NEGATIVE: 1
RESPIRATORY NEGATIVE: 1
MUSCULOSKELETAL NEGATIVE: 1
CONSTITUTIONAL NEGATIVE: 1
HEMATOLOGIC/LYMPHATIC NEGATIVE: 1
CARDIOVASCULAR NEGATIVE: 1
EYES NEGATIVE: 1
PSYCHIATRIC NEGATIVE: 1
GASTROINTESTINAL NEGATIVE: 1

## 2025-03-31 ENCOUNTER — TELEPHONE (OUTPATIENT)
Dept: CARDIOLOGY | Facility: CLINIC | Age: 75
End: 2025-03-31
Payer: MEDICARE

## 2025-03-31 ENCOUNTER — PATIENT OUTREACH (OUTPATIENT)
Dept: CARDIOLOGY | Facility: CLINIC | Age: 75
End: 2025-03-31
Payer: MEDICARE

## 2025-03-31 NOTE — PROGRESS NOTES
Discharge Facility:  [facility]  Discharge Diagnosis:  CP  Admission Date: 3/28/25 OBS  Discharge Date:     PCP Appointment Date: Patient encouraged to make an appt  Specialist Appointment Date:   MAY 5  2025 02:15 PM - Cardiology Clinic Visit  [facility] Dr Ayla Bellamy MD   Message sent for Dr Bellamy  Hospital Encounter and Summary Linked: Yes    Hospital Encounter    See discharge assessment below for further details     Wrap Up  Wrap Up Additional Comments: Patient denies any CP. Reviewed new medications Imdur and NTG. This CM will send a message to Dr Bellamy's office for follow up and patient encouraged to make a follow up with PCP.This CM gives contact information for non urgent matters (3/31/2025  1:20 PM)    Engagement  Call Start Time: 1317 (3/31/2025  1:20 PM)    Medications  Medications reviewed with patient/caregiver?: Yes (3/31/2025  1:20 PM)  Prescription Comments: IMdur, NTG (3/31/2025  1:20 PM)  Is the patient taking all medications as directed (includes completed medication regime)?: Yes (3/31/2025  1:20 PM)    Appointments  Does the patient have a primary care provider?: Yes (3/31/2025  1:20 PM)  Care Management Interventions: Advised patient to make appointment (3/31/2025  1:20 PM)  Care Management Interventions: Advised to schedule with specialist (3/31/2025  1:20 PM)  Follow Up Tasks: Appointments (3/31/2025  1:20 PM)    Self Management  What is the home health agency?: n/a (3/31/2025  1:20 PM)  What Durable Medical Equipment (DME) was ordered?: n/a (3/31/2025  1:20 PM)    Patient Teaching  Does the patient have access to their discharge instructions?: Yes (3/31/2025  1:20 PM)  Care Management Interventions: Reviewed instructions with patient (3/31/2025  1:20 PM)  What is the patient's perception of their health status since discharge?: Improving (3/31/2025  1:20 PM)  Is the patient/caregiver able to teach back the hierarchy of who to call/visit for symptoms/problems? PCP, Specialist, Home  Health nurse, Urgent Care, ED, 911: Yes (3/31/2025  1:20 PM)

## 2025-03-31 NOTE — TELEPHONE ENCOUNTER
----- Message from Kylee Cleaning sent at 3/31/2025  1:22 PM EDT -----  Regarding: TCM    Can you please contact pt to schedule hosp follow up within 13 days of discharge.  This patient was discharged from: [facility]  Discharge diagnosis:   CP  Date of discharge:      3/28/25    Thank you

## 2025-04-04 ENCOUNTER — APPOINTMENT (OUTPATIENT)
Dept: RADIOLOGY | Facility: CLINIC | Age: 75
End: 2025-04-04
Payer: MEDICARE

## 2025-04-05 ENCOUNTER — HOSPITAL ENCOUNTER (OUTPATIENT)
Dept: RADIOLOGY | Facility: CLINIC | Age: 75
Discharge: HOME | End: 2025-04-05
Payer: MEDICARE

## 2025-04-05 ENCOUNTER — LAB (OUTPATIENT)
Dept: LAB | Facility: HOSPITAL | Age: 75
End: 2025-04-05
Payer: MEDICARE

## 2025-04-05 DIAGNOSIS — Z12.31 ENCOUNTER FOR SCREENING MAMMOGRAM FOR MALIGNANT NEOPLASM OF BREAST: ICD-10-CM

## 2025-04-05 DIAGNOSIS — C50.512 MALIGNANT NEOPLASM OF LOWER-OUTER QUADRANT OF LEFT FEMALE BREAST: Primary | ICD-10-CM

## 2025-04-05 PROCEDURE — 80053 COMPREHEN METABOLIC PANEL: CPT

## 2025-04-05 PROCEDURE — 82378 CARCINOEMBRYONIC ANTIGEN: CPT

## 2025-04-05 PROCEDURE — 36415 COLL VENOUS BLD VENIPUNCTURE: CPT

## 2025-04-05 PROCEDURE — 85025 COMPLETE CBC W/AUTO DIFF WBC: CPT

## 2025-04-07 ENCOUNTER — LAB REQUISITION (OUTPATIENT)
Dept: LAB | Facility: HOSPITAL | Age: 75
End: 2025-04-07
Payer: MEDICARE

## 2025-04-07 DIAGNOSIS — C50.512 MALIGNANT NEOPLASM OF LOWER-OUTER QUADRANT OF LEFT FEMALE BREAST: ICD-10-CM

## 2025-04-07 LAB
ALBUMIN SERPL BCP-MCNC: 4.5 G/DL (ref 3.4–5)
ALP SERPL-CCNC: 75 U/L (ref 33–136)
ALT SERPL W P-5'-P-CCNC: 21 U/L (ref 7–45)
ANION GAP SERPL CALC-SCNC: 17 MMOL/L (ref 10–20)
AST SERPL W P-5'-P-CCNC: 24 U/L (ref 9–39)
BASOPHILS # BLD AUTO: 0.04 X10*3/UL (ref 0–0.1)
BASOPHILS NFR BLD AUTO: 0.8 %
BILIRUB SERPL-MCNC: 0.3 MG/DL (ref 0–1.2)
BUN SERPL-MCNC: 21 MG/DL (ref 6–23)
CALCIUM SERPL-MCNC: 9.2 MG/DL (ref 8.6–10.3)
CEA SERPL-MCNC: 2 UG/L
CHLORIDE SERPL-SCNC: 100 MMOL/L (ref 98–107)
CO2 SERPL-SCNC: 24 MMOL/L (ref 21–32)
CREAT SERPL-MCNC: 1.2 MG/DL (ref 0.5–1.05)
EGFRCR SERPLBLD CKD-EPI 2021: 47 ML/MIN/1.73M*2
EOSINOPHIL # BLD AUTO: 0.12 X10*3/UL (ref 0–0.4)
EOSINOPHIL NFR BLD AUTO: 2.3 %
ERYTHROCYTE [DISTWIDTH] IN BLOOD BY AUTOMATED COUNT: 14.7 % (ref 11.5–14.5)
GLUCOSE SERPL-MCNC: 112 MG/DL (ref 74–99)
HCT VFR BLD AUTO: 31.8 % (ref 36–46)
HGB BLD-MCNC: 9.7 G/DL (ref 12–16)
IMM GRANULOCYTES NFR BLD AUTO: 0.6 % (ref 0–0.9)
LYMPHOCYTES # BLD AUTO: 1.47 X10*3/UL (ref 0.8–3)
LYMPHOCYTES NFR BLD AUTO: 28.7 %
MCHC RBC AUTO-ENTMCNC: 30.5 G/DL (ref 32–36)
MCV RBC AUTO: 98 FL (ref 80–100)
MONOCYTES # BLD AUTO: 0.56 X10*3/UL (ref 0.05–0.8)
MONOCYTES NFR BLD AUTO: 10.9 %
NEUTROPHILS # BLD AUTO: 2.91 X10*3/UL (ref 1.6–5.5)
NEUTROPHILS NFR BLD AUTO: 56.7 %
PLATELET # BLD AUTO: 289 X10*3/UL (ref 150–450)
POTASSIUM SERPL-SCNC: 4.9 MMOL/L (ref 3.5–5.3)
PROT SERPL-MCNC: 6.7 G/DL (ref 6.4–8.2)
RBC # BLD AUTO: 3.24 X10*6/UL (ref 4–5.2)
SODIUM SERPL-SCNC: 136 MMOL/L (ref 136–145)
WBC # BLD AUTO: 5.1 X10*3/UL (ref 4.4–11.3)

## 2025-04-09 ENCOUNTER — HOSPITAL ENCOUNTER (OUTPATIENT)
Dept: RADIOLOGY | Facility: CLINIC | Age: 75
Discharge: HOME | End: 2025-04-09
Payer: MEDICARE

## 2025-04-09 DIAGNOSIS — Z12.31 ENCOUNTER FOR SCREENING MAMMOGRAM FOR MALIGNANT NEOPLASM OF BREAST: ICD-10-CM

## 2025-04-09 DIAGNOSIS — Z85.3 PERSONAL HISTORY OF MALIGNANT NEOPLASM OF BREAST: ICD-10-CM

## 2025-04-09 PROCEDURE — 77066 DX MAMMO INCL CAD BI: CPT | Performed by: RADIOLOGY

## 2025-04-09 PROCEDURE — G0279 TOMOSYNTHESIS, MAMMO: HCPCS | Performed by: RADIOLOGY

## 2025-04-09 PROCEDURE — 77062 BREAST TOMOSYNTHESIS BI: CPT

## 2025-04-14 ENCOUNTER — PATIENT OUTREACH (OUTPATIENT)
Dept: CARDIOLOGY | Facility: CLINIC | Age: 75
End: 2025-04-14
Payer: MEDICARE

## 2025-04-14 NOTE — PROGRESS NOTES
Discharge Facility:  [facility]  Discharge Diagnosis:  CP  Admission Date: 4/11/25   Discharge Date:     PCP Appointment Date: Pt encouraged   Specialist Appointment Date:   Hospital Encounter and Summary Linked: Yes  Hospital Encounter    See discharge assessment below for further details     Wrap Up  Wrap Up Additional Comments: Patient denies any CP at this time she states she has had multiple episodes this month where she wakes up with CP she will be following up with pulmonary for possible sleep apnea per patient statement. This CM reviewed appt with Dr Bellamy on 4/15 and encouraged an appt with PCP. Reviewed new medication and patient has an understanding of indications (4/14/2025  2:42 PM)    Engagement  Call Start Time: 1439 (4/14/2025  2:42 PM)    Medications  Medications reviewed with patient/caregiver?: Yes (4/14/2025  2:42 PM)  Is the patient having any side effects they believe may be caused by any medication additions or changes?: No (4/14/2025  2:42 PM)  Prescription Comments: Imdur (4/14/2025  2:42 PM)  Is the patient taking all medications as directed (includes completed medication regime)?: Yes (4/14/2025  2:42 PM)    Appointments  Does the patient have a primary care provider?: Yes (4/14/2025  2:42 PM)  Care Management Interventions: Advised patient to make appointment (4/14/2025  2:42 PM)  Care Management Interventions: Advised patient to keep appointment (4/14/2025  2:42 PM)    Self Management  What is the home health agency?: n/a (4/14/2025  2:42 PM)  What Durable Medical Equipment (DME) was ordered?: n/a (4/14/2025  2:42 PM)    Patient Teaching  Does the patient have access to their discharge instructions?: Yes (4/14/2025  2:42 PM)  Care Management Interventions: Reviewed instructions with patient (4/14/2025  2:42 PM)  What is the patient's perception of their health status since discharge?: Improving (4/14/2025  2:42 PM)  Is the patient/caregiver able to teach back the hierarchy of who to  call/visit for symptoms/problems? PCP, Specialist, Home Health nurse, Urgent Care, ED, 911: Yes (4/14/2025  2:42 PM)

## 2025-04-15 ENCOUNTER — OFFICE VISIT (OUTPATIENT)
Dept: CARDIOLOGY | Facility: CLINIC | Age: 75
End: 2025-04-15
Payer: MEDICARE

## 2025-04-15 VITALS
DIASTOLIC BLOOD PRESSURE: 60 MMHG | WEIGHT: 184.2 LBS | SYSTOLIC BLOOD PRESSURE: 120 MMHG | HEART RATE: 74 BPM | BODY MASS INDEX: 32.64 KG/M2 | HEIGHT: 63 IN

## 2025-04-15 DIAGNOSIS — E78.5 HYPERLIPIDEMIA, UNSPECIFIED HYPERLIPIDEMIA TYPE: ICD-10-CM

## 2025-04-15 DIAGNOSIS — J44.9 CHRONIC OBSTRUCTIVE PULMONARY DISEASE, UNSPECIFIED COPD TYPE (MULTI): ICD-10-CM

## 2025-04-15 DIAGNOSIS — E11.9 TYPE 2 DIABETES MELLITUS WITHOUT COMPLICATION, WITHOUT LONG-TERM CURRENT USE OF INSULIN: ICD-10-CM

## 2025-04-15 DIAGNOSIS — R06.02 SHORTNESS OF BREATH: ICD-10-CM

## 2025-04-15 DIAGNOSIS — K21.00 GASTROESOPHAGEAL REFLUX DISEASE WITH ESOPHAGITIS WITHOUT HEMORRHAGE: ICD-10-CM

## 2025-04-15 DIAGNOSIS — Z98.61 CAD S/P PERCUTANEOUS CORONARY ANGIOPLASTY: Primary | ICD-10-CM

## 2025-04-15 DIAGNOSIS — I25.10 CAD S/P PERCUTANEOUS CORONARY ANGIOPLASTY: Primary | ICD-10-CM

## 2025-04-15 DIAGNOSIS — I10 HYPERTENSION, UNSPECIFIED TYPE: ICD-10-CM

## 2025-04-15 DIAGNOSIS — R07.9 CHEST PAIN, UNSPECIFIED TYPE: ICD-10-CM

## 2025-04-15 DIAGNOSIS — E66.811 OBESITY (BMI 30.0-34.9): ICD-10-CM

## 2025-04-15 DIAGNOSIS — Z87.891 FORMER SMOKER: ICD-10-CM

## 2025-04-15 PROBLEM — E66.3 OVERWEIGHT: Status: RESOLVED | Noted: 2024-01-08 | Resolved: 2025-04-15

## 2025-04-15 PROCEDURE — 1036F TOBACCO NON-USER: CPT | Performed by: INTERNAL MEDICINE

## 2025-04-15 PROCEDURE — 4010F ACE/ARB THERAPY RXD/TAKEN: CPT | Performed by: INTERNAL MEDICINE

## 2025-04-15 PROCEDURE — 1159F MED LIST DOCD IN RCRD: CPT | Performed by: INTERNAL MEDICINE

## 2025-04-15 PROCEDURE — 99214 OFFICE O/P EST MOD 30 MIN: CPT | Performed by: INTERNAL MEDICINE

## 2025-04-15 PROCEDURE — 3078F DIAST BP <80 MM HG: CPT | Performed by: INTERNAL MEDICINE

## 2025-04-15 PROCEDURE — 3074F SYST BP LT 130 MM HG: CPT | Performed by: INTERNAL MEDICINE

## 2025-04-15 RX ORDER — CALCIUM CARBONATE 300MG(750)
400 TABLET,CHEWABLE ORAL 3 TIMES DAILY
COMMUNITY

## 2025-04-15 NOTE — PROGRESS NOTES
Referred by Dr. Asif ref. provider found provider found     Chief complaint:   Chief Complaint   Patient presents with    TCM visit     Patient being seen for 1 week TCM visit from [facility] for chest pain.  Has been there 3 times in the past month.        History of Present Illness  Lisa Andrade is a 74 y.o. year old female patient is here for follow-up.  She was admitted to [facility] 3 times with recurrent chest pain.  She had an echocardiogram which was normal.  She had a stress test which was inconclusive.  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  She continues to have chest pain requiring nitroglycerin.  Imdur was increased.  She does have an appointment to see physician for possible sleep apnea.  I had a lengthy discussion with the patient today.  Her previous history of coronary disease and recurrent symptoms we will go ahead with coronary angiography.  Risk and benefit explained to the patient based on the results further recommendation will be made    Past Medical History  Past Medical History:   Diagnosis Date    Breast cancer     Chemotherapy-induced peripheral neuropathy (Multi)     Emphysema of lung (Multi)     Failed back syndrome of cervical spine 01/08/2024    Fibromyalgia     Former smoker     GERD (gastroesophageal reflux disease)     Hiatal hernia     History of cardiac murmur     Hx antineoplastic chemo     Hyperlipidemia     Hypertension     Hypokalemia     Iron deficiency anemia     Lung cancer (Multi)     Old myocardial infarction     Personal history of irradiation     Personal history of malignant neoplasm of breast     Rheumatoid arthritis     Type 2 diabetes mellitus without complication, without long-term current use of insulin        Social History  Social History     Tobacco Use    Smoking status: Former     Types: Cigarettes    Smokeless tobacco: Never   Vaping Use    Vaping  status: Never Used   Substance Use Topics    Alcohol use: Not Currently    Drug use: Never       Family History     Family History   Problem Relation Name Age of Onset    Hypertension Mother      Coronary artery disease Mother      Migraines Mother      No Known Problems Father      Hodgkin's lymphoma Sister      Uterine cancer Sister         Review of Systems  As per HPI, all other systems reviewed and negative.    Allergies:  Allergies   Allergen Reactions    Fish Oil Nausea/vomiting    Lipitor [Atorvastatin] Rash    Wasp Venom Unknown    Zocor [Simvastatin] Rash        Outpatient Medications:  Current Outpatient Medications   Medication Instructions    amLODIPine (NORVASC) 5 mg, Daily    cetirizine (ZYRTEC) 10 mg, oral, Daily    fluticasone (Flonase) 50 mcg/actuation nasal spray 2 sprays, Each Nostril, Daily, Shake gently. Before first use, prime pump. After use, clean tip and replace cap.    fluticasone-umeclidin-vilanter (Trelegy Ellipta) 100-62.5-25 mcg blister with device 1 puff, Daily    glipiZIDE XL (Glucotrol XL) 2.5 mg 24 hr tablet 1 tablet, Daily    hydroCHLOROthiazide (HYDRODIURIL) 25 mg, Daily RT    HYDROcodone-acetaminophen (Norco) 5-325 mg tablet 1 tablet, Every 6 hours PRN    isosorbide mononitrate ER (Imdur) 30 mg 24 hr tablet 1 tablet, Daily    losartan (COZAAR) 100 mg, Daily    magnesium oxide (MAG-OX) 400 mg, 3 times daily    metFORMIN (GLUCOPHAGE) 500 mg, 2 times daily    metoprolol tartrate (LOPRESSOR) 50 mg, 2 times daily    nitroglycerin (NITROSTAT) 0.4 mg, Every 5 min PRN    omeprazole (PRILOSEC) 20 mg, Daily before breakfast    pravastatin (PRAVACHOL) 80 mg, Daily    pravastatin (PRAVACHOL) 40 mg, Every other day    spironolactone (ALDACTONE) 25 mg, Daily         Vitals:  Vitals:    04/15/25 1125   BP: 120/60   Pulse: 74       Physical Exam:  Physical Exam  Vitals and nursing note reviewed.   Constitutional:       Appearance: Normal appearance. She is normal weight.   HENT:      Head:  Normocephalic and atraumatic.   Eyes:      Extraocular Movements: Extraocular movements intact.      Pupils: Pupils are equal, round, and reactive to light.   Cardiovascular:      Rate and Rhythm: Normal rate and regular rhythm.      Pulses: Normal pulses.   Pulmonary:      Effort: Pulmonary effort is normal.      Breath sounds: Normal breath sounds.   Musculoskeletal:      Cervical back: Normal range of motion.      Right lower leg: No edema.      Left lower leg: No edema.   Skin:     General: Skin is warm and dry.   Neurological:      General: No focal deficit present.      Mental Status: She is alert and oriented to person, place, and time.             Assessment/Plan   Diagnoses and all orders for this visit:  CAD S/P percutaneous coronary angioplasty  Hyperlipidemia, unspecified hyperlipidemia type  Hypertension, unspecified type  Chest pain, unspecified type  Type 2 diabetes mellitus without complication, without long-term current use of insulin  Obesity (BMI 30.0-34.9)  Gastroesophageal reflux disease with esophagitis without hemorrhage  Shortness of breath  Chronic obstructive pulmonary disease, unspecified COPD type (Multi)  Former smoker      IPramod RN   am scribing for, and in the presence of Dr. Charles Bellamy MD [facility] .    I, Dr. Charles Bellamy MD [facility] r, personally performed the services described in the documentation as scribed by jh  in my presence, and confirm it is both accurate and complete.    Charles Bellamy MD [facility]  Interventional Cardiology   of [facility]     Thank you for allowing me to participate in the care of this patient. Please do not hesitate to contact me with any further questions or concerns.

## 2025-04-15 NOTE — H&P (VIEW-ONLY)
Referred by Dr. Asif ref. provider found provider found     Chief complaint:   Chief Complaint   Patient presents with    TCM visit     Patient being seen for 1 week TCM visit from [facility] for chest pain.  Has been there 3 times in the past month.        History of Present Illness  Lisa Andrade is a 74 y.o. year old female patient is here for follow-up.  She was admitted to [facility] 3 times with recurrent chest pain.  She had an echocardiogram which was normal.  She had a stress test which was inconclusive.  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  She continues to have chest pain requiring nitroglycerin.  Imdur was increased.  She does have an appointment to see physician for possible sleep apnea.  I had a lengthy discussion with the patient today.  Her previous history of coronary disease and recurrent symptoms we will go ahead with coronary angiography.  Risk and benefit explained to the patient based on the results further recommendation will be made    Past Medical History  Past Medical History:   Diagnosis Date    Breast cancer     Chemotherapy-induced peripheral neuropathy (Multi)     Emphysema of lung (Multi)     Failed back syndrome of cervical spine 01/08/2024    Fibromyalgia     Former smoker     GERD (gastroesophageal reflux disease)     Hiatal hernia     History of cardiac murmur     Hx antineoplastic chemo     Hyperlipidemia     Hypertension     Hypokalemia     Iron deficiency anemia     Lung cancer (Multi)     Old myocardial infarction     Personal history of irradiation     Personal history of malignant neoplasm of breast     Rheumatoid arthritis     Type 2 diabetes mellitus without complication, without long-term current use of insulin        Social History  Social History     Tobacco Use    Smoking status: Former     Types: Cigarettes    Smokeless tobacco: Never   Vaping Use    Vaping  status: Never Used   Substance Use Topics    Alcohol use: Not Currently    Drug use: Never       Family History     Family History   Problem Relation Name Age of Onset    Hypertension Mother      Coronary artery disease Mother      Migraines Mother      No Known Problems Father      Hodgkin's lymphoma Sister      Uterine cancer Sister         Review of Systems  As per HPI, all other systems reviewed and negative.    Allergies:  Allergies   Allergen Reactions    Fish Oil Nausea/vomiting    Lipitor [Atorvastatin] Rash    Wasp Venom Unknown    Zocor [Simvastatin] Rash        Outpatient Medications:  Current Outpatient Medications   Medication Instructions    amLODIPine (NORVASC) 5 mg, Daily    cetirizine (ZYRTEC) 10 mg, oral, Daily    fluticasone (Flonase) 50 mcg/actuation nasal spray 2 sprays, Each Nostril, Daily, Shake gently. Before first use, prime pump. After use, clean tip and replace cap.    fluticasone-umeclidin-vilanter (Trelegy Ellipta) 100-62.5-25 mcg blister with device 1 puff, Daily    glipiZIDE XL (Glucotrol XL) 2.5 mg 24 hr tablet 1 tablet, Daily    hydroCHLOROthiazide (HYDRODIURIL) 25 mg, Daily RT    HYDROcodone-acetaminophen (Norco) 5-325 mg tablet 1 tablet, Every 6 hours PRN    isosorbide mononitrate ER (Imdur) 30 mg 24 hr tablet 1 tablet, Daily    losartan (COZAAR) 100 mg, Daily    magnesium oxide (MAG-OX) 400 mg, 3 times daily    metFORMIN (GLUCOPHAGE) 500 mg, 2 times daily    metoprolol tartrate (LOPRESSOR) 50 mg, 2 times daily    nitroglycerin (NITROSTAT) 0.4 mg, Every 5 min PRN    omeprazole (PRILOSEC) 20 mg, Daily before breakfast    pravastatin (PRAVACHOL) 80 mg, Daily    pravastatin (PRAVACHOL) 40 mg, Every other day    spironolactone (ALDACTONE) 25 mg, Daily         Vitals:  Vitals:    04/15/25 1125   BP: 120/60   Pulse: 74       Physical Exam:  Physical Exam  Vitals and nursing note reviewed.   Constitutional:       Appearance: Normal appearance. She is normal weight.   HENT:      Head:  Normocephalic and atraumatic.   Eyes:      Extraocular Movements: Extraocular movements intact.      Pupils: Pupils are equal, round, and reactive to light.   Cardiovascular:      Rate and Rhythm: Normal rate and regular rhythm.      Pulses: Normal pulses.   Pulmonary:      Effort: Pulmonary effort is normal.      Breath sounds: Normal breath sounds.   Musculoskeletal:      Cervical back: Normal range of motion.      Right lower leg: No edema.      Left lower leg: No edema.   Skin:     General: Skin is warm and dry.   Neurological:      General: No focal deficit present.      Mental Status: She is alert and oriented to person, place, and time.             Assessment/Plan   Diagnoses and all orders for this visit:  CAD S/P percutaneous coronary angioplasty  Hyperlipidemia, unspecified hyperlipidemia type  Hypertension, unspecified type  Chest pain, unspecified type  Type 2 diabetes mellitus without complication, without long-term current use of insulin  Obesity (BMI 30.0-34.9)  Gastroesophageal reflux disease with esophagitis without hemorrhage  Shortness of breath  Chronic obstructive pulmonary disease, unspecified COPD type (Multi)  Former smoker      IPramod RN   am scribing for, and in the presence of Dr. Charles Bellamy MD [facility] .    I, Dr. Charles Bellamy MD [facility] r, personally performed the services described in the documentation as scribed by jh  in my presence, and confirm it is both accurate and complete.    Charles Bellamy MD [facility]  Interventional Cardiology   of [facility]     Thank you for allowing me to participate in the care of this patient. Please do not hesitate to contact me with any further questions or concerns.

## 2025-04-15 NOTE — PATIENT INSTRUCTIONS
Patient to proceed with left heart cath with Dr. Charles Bellamy MD [facility] in near future.   Please take your normal morning medications, including your Aspirin, the day of the procedure, with exception of below. This is important.   Please HOLD your Metformin and Glipizide the day of the procedure.     Please START Aspirin 81mg once daily     No other changes today.   -----------------------------------------        Pre-Procedure Patient Information    You have been scheduled for: Left Heart Catheterization  At: [facility]  With: Dr. Charles Bellamy  Date of procedure: Monday April 28th, 2025    1. Please have transportation to and from the hospital. While you should plan for same-day discharge there is a possibility you will need to stay overnight.    2. You will receive a call from the hospital 24 hours before your procedure providing you with fasting instructions, procedure location detail, and time of arrival.  If you have not received a call from the hospital by 6 pm the day before your scheduled procedure, please call [phone].     3. Please bring a current list of medications with you to the hospital.      4. Medications to hold:     - If you are on aspirin, Plavix (Clopidogrel), Effient (Prasugrel), or Brilinta (Ticagrelor), please continue thru day of procedure.       - If you are diabetic on oral pills: please hold your morning oral diabetes medications (that includes metformin, glipizide).     - Otherwise, you may continue your medications in the morning with sips of water.     5. Nothing to eat after midnight before the procedure. OK to take morning medications, with above exceptions, the day of the procedure with a small sip of water.     6. Please bring to our attention if you have any contrast, latex or metal allergies.    7. Please have your blood work as instructed completed at least a day before your procedure.    8. If you have any questions, please contact the office at [phone].

## 2025-04-21 ENCOUNTER — APPOINTMENT (OUTPATIENT)
Dept: CARDIOLOGY | Facility: CLINIC | Age: 75
End: 2025-04-21
Payer: MEDICARE

## 2025-04-28 ENCOUNTER — HOSPITAL ENCOUNTER (OUTPATIENT)
Facility: HOSPITAL | Age: 75
Setting detail: OUTPATIENT SURGERY
Discharge: HOME | End: 2025-04-28
Attending: INTERNAL MEDICINE | Admitting: INTERNAL MEDICINE
Payer: MEDICARE

## 2025-04-28 ENCOUNTER — APPOINTMENT (OUTPATIENT)
Dept: CARDIOLOGY | Facility: HOSPITAL | Age: 75
End: 2025-04-28
Payer: MEDICARE

## 2025-04-28 VITALS
SYSTOLIC BLOOD PRESSURE: 122 MMHG | HEART RATE: 66 BPM | OXYGEN SATURATION: 94 % | WEIGHT: 181.44 LBS | BODY MASS INDEX: 32.15 KG/M2 | DIASTOLIC BLOOD PRESSURE: 57 MMHG | HEIGHT: 63 IN | TEMPERATURE: 97.5 F | RESPIRATION RATE: 18 BRPM

## 2025-04-28 DIAGNOSIS — I10 HYPERTENSION, UNSPECIFIED TYPE: ICD-10-CM

## 2025-04-28 DIAGNOSIS — R07.9 CHEST PAIN, UNSPECIFIED TYPE: ICD-10-CM

## 2025-04-28 DIAGNOSIS — Z98.61 CAD S/P PERCUTANEOUS CORONARY ANGIOPLASTY: Primary | ICD-10-CM

## 2025-04-28 DIAGNOSIS — I20.9 ANGINA PECTORIS, UNSPECIFIED: ICD-10-CM

## 2025-04-28 DIAGNOSIS — I25.10 CAD S/P PERCUTANEOUS CORONARY ANGIOPLASTY: Primary | ICD-10-CM

## 2025-04-28 DIAGNOSIS — R06.02 SHORTNESS OF BREATH: ICD-10-CM

## 2025-04-28 LAB
ANION GAP SERPL CALC-SCNC: 12 MMOL/L (ref 10–20)
ATRIAL RATE: 63 BPM
BUN SERPL-MCNC: 23 MG/DL (ref 6–23)
CALCIUM SERPL-MCNC: 9.3 MG/DL (ref 8.6–10.3)
CHLORIDE SERPL-SCNC: 103 MMOL/L (ref 98–107)
CO2 SERPL-SCNC: 26 MMOL/L (ref 21–32)
CREAT SERPL-MCNC: 1.2 MG/DL (ref 0.5–1.05)
EGFRCR SERPLBLD CKD-EPI 2021: 47 ML/MIN/1.73M*2
ERYTHROCYTE [DISTWIDTH] IN BLOOD BY AUTOMATED COUNT: 14.4 % (ref 11.5–14.5)
GLUCOSE SERPL-MCNC: 131 MG/DL (ref 74–99)
HCT VFR BLD AUTO: 27.7 % (ref 36–46)
HGB BLD-MCNC: 8.6 G/DL (ref 12–16)
MCH RBC QN AUTO: 29.2 PG (ref 26–34)
MCHC RBC AUTO-ENTMCNC: 31 G/DL (ref 32–36)
MCV RBC AUTO: 94 FL (ref 80–100)
NRBC BLD-RTO: 0 /100 WBCS (ref 0–0)
P AXIS: 58 DEGREES
P OFFSET: 206 MS
P ONSET: 156 MS
PLATELET # BLD AUTO: 242 X10*3/UL (ref 150–450)
POTASSIUM SERPL-SCNC: 4.6 MMOL/L (ref 3.5–5.3)
PR INTERVAL: 132 MS
Q ONSET: 222 MS
QRS COUNT: 10 BEATS
QRS DURATION: 84 MS
QT INTERVAL: 396 MS
QTC CALCULATION(BAZETT): 405 MS
QTC FREDERICIA: 402 MS
R AXIS: 14 DEGREES
RBC # BLD AUTO: 2.95 X10*6/UL (ref 4–5.2)
SODIUM SERPL-SCNC: 136 MMOL/L (ref 136–145)
T AXIS: 39 DEGREES
T OFFSET: 420 MS
VENTRICULAR RATE: 63 BPM
WBC # BLD AUTO: 4.9 X10*3/UL (ref 4.4–11.3)

## 2025-04-28 PROCEDURE — C1894 INTRO/SHEATH, NON-LASER: HCPCS | Performed by: INTERNAL MEDICINE

## 2025-04-28 PROCEDURE — 99152 MOD SED SAME PHYS/QHP 5/>YRS: CPT | Performed by: INTERNAL MEDICINE

## 2025-04-28 PROCEDURE — 36415 COLL VENOUS BLD VENIPUNCTURE: CPT | Performed by: NURSE PRACTITIONER

## 2025-04-28 PROCEDURE — 7100000001 HC RECOVERY ROOM TIME - INITIAL BASE CHARGE: Performed by: INTERNAL MEDICINE

## 2025-04-28 PROCEDURE — 96373 THER/PROPH/DIAG INJ IA: CPT | Mod: 59 | Performed by: INTERNAL MEDICINE

## 2025-04-28 PROCEDURE — 80048 BASIC METABOLIC PNL TOTAL CA: CPT | Performed by: NURSE PRACTITIONER

## 2025-04-28 PROCEDURE — 7100000002 HC RECOVERY ROOM TIME - EACH INCREMENTAL 1 MINUTE: Performed by: INTERNAL MEDICINE

## 2025-04-28 PROCEDURE — 93458 L HRT ARTERY/VENTRICLE ANGIO: CPT | Performed by: INTERNAL MEDICINE

## 2025-04-28 PROCEDURE — 7100000009 HC PHASE TWO TIME - INITIAL BASE CHARGE: Performed by: INTERNAL MEDICINE

## 2025-04-28 PROCEDURE — 93010 ELECTROCARDIOGRAM REPORT: CPT | Performed by: INTERNAL MEDICINE

## 2025-04-28 PROCEDURE — 2500000004 HC RX 250 GENERAL PHARMACY W/ HCPCS (ALT 636 FOR OP/ED): Mod: JZ | Performed by: NURSE PRACTITIONER

## 2025-04-28 PROCEDURE — 85027 COMPLETE CBC AUTOMATED: CPT | Performed by: NURSE PRACTITIONER

## 2025-04-28 PROCEDURE — 2550000001 HC RX 255 CONTRASTS: Performed by: INTERNAL MEDICINE

## 2025-04-28 PROCEDURE — 7100000010 HC PHASE TWO TIME - EACH INCREMENTAL 1 MINUTE: Performed by: INTERNAL MEDICINE

## 2025-04-28 PROCEDURE — 93005 ELECTROCARDIOGRAM TRACING: CPT

## 2025-04-28 PROCEDURE — 2500000004 HC RX 250 GENERAL PHARMACY W/ HCPCS (ALT 636 FOR OP/ED): Performed by: INTERNAL MEDICINE

## 2025-04-28 PROCEDURE — 99024 POSTOP FOLLOW-UP VISIT: CPT | Performed by: NURSE PRACTITIONER

## 2025-04-28 PROCEDURE — C1887 CATHETER, GUIDING: HCPCS | Performed by: INTERNAL MEDICINE

## 2025-04-28 PROCEDURE — 2720000007 HC OR 272 NO HCPCS: Performed by: INTERNAL MEDICINE

## 2025-04-28 RX ORDER — ASPIRIN 325 MG
325 TABLET ORAL ONCE
Status: DISCONTINUED | OUTPATIENT
Start: 2025-04-28 | End: 2025-04-28

## 2025-04-28 RX ORDER — NITROGLYCERIN 40 MG/100ML
INJECTION INTRAVENOUS AS NEEDED
Status: DISCONTINUED | OUTPATIENT
Start: 2025-04-28 | End: 2025-04-28 | Stop reason: HOSPADM

## 2025-04-28 RX ORDER — SODIUM CHLORIDE 9 MG/ML
75 INJECTION, SOLUTION INTRAVENOUS CONTINUOUS
Status: DISCONTINUED | OUTPATIENT
Start: 2025-04-28 | End: 2025-04-28 | Stop reason: HOSPADM

## 2025-04-28 RX ORDER — MIDAZOLAM HYDROCHLORIDE 1 MG/ML
INJECTION, SOLUTION INTRAMUSCULAR; INTRAVENOUS AS NEEDED
Status: DISCONTINUED | OUTPATIENT
Start: 2025-04-28 | End: 2025-04-28 | Stop reason: HOSPADM

## 2025-04-28 RX ORDER — FENTANYL CITRATE 50 UG/ML
INJECTION, SOLUTION INTRAMUSCULAR; INTRAVENOUS AS NEEDED
Status: DISCONTINUED | OUTPATIENT
Start: 2025-04-28 | End: 2025-04-28 | Stop reason: HOSPADM

## 2025-04-28 RX ORDER — ASPIRIN 81 MG/1
81 TABLET ORAL DAILY
COMMUNITY

## 2025-04-28 RX ORDER — HEPARIN SODIUM 1000 [USP'U]/ML
INJECTION, SOLUTION INTRAVENOUS; SUBCUTANEOUS AS NEEDED
Status: DISCONTINUED | OUTPATIENT
Start: 2025-04-28 | End: 2025-04-28 | Stop reason: HOSPADM

## 2025-04-28 RX ORDER — RANOLAZINE 500 MG/1
500 TABLET, EXTENDED RELEASE ORAL 2 TIMES DAILY
Qty: 60 TABLET | Refills: 5 | Status: SHIPPED | OUTPATIENT
Start: 2025-04-28

## 2025-04-28 RX ORDER — LIDOCAINE HYDROCHLORIDE 20 MG/ML
INJECTION, SOLUTION INFILTRATION; PERINEURAL AS NEEDED
Status: DISCONTINUED | OUTPATIENT
Start: 2025-04-28 | End: 2025-04-28 | Stop reason: HOSPADM

## 2025-04-28 RX ADMIN — SODIUM CHLORIDE 75 ML/HR: 9 INJECTION, SOLUTION INTRAVENOUS at 11:01

## 2025-04-28 ASSESSMENT — PAIN SCALES - GENERAL
PAINLEVEL_OUTOF10: 0 - NO PAIN

## 2025-04-28 ASSESSMENT — PAIN - FUNCTIONAL ASSESSMENT
PAIN_FUNCTIONAL_ASSESSMENT: 0-10

## 2025-04-28 ASSESSMENT — COLUMBIA-SUICIDE SEVERITY RATING SCALE - C-SSRS
2. HAVE YOU ACTUALLY HAD ANY THOUGHTS OF KILLING YOURSELF?: NO
6. HAVE YOU EVER DONE ANYTHING, STARTED TO DO ANYTHING, OR PREPARED TO DO ANYTHING TO END YOUR LIFE?: NO
1. IN THE PAST MONTH, HAVE YOU WISHED YOU WERE DEAD OR WISHED YOU COULD GO TO SLEEP AND NOT WAKE UP?: NO

## 2025-04-28 NOTE — NURSING NOTE
Patient recovered from cath lab with right radial vascband in place and stable.  No complaints.  Drinking water and daughters at the bedside.  Patient instructed to limit activity with right wrist and bleeding interventions if needed.

## 2025-04-28 NOTE — NURSING NOTE
Discharge instructions discussed reinforcing radial site bleeding interventions, activity and driving restrictions, and increasing fluids over the next 24 hours.  Patient discharged to home via wheelchair and RN to daughter in car.

## 2025-04-28 NOTE — NURSING NOTE
Patient ambulating in room without difficulty.  Daughters present.  Ntg Sl teaching done and importance of ranexa and resuming all other medications enforced.  Daughter will  ranexa RX on the way home and patient will start night time  dose today.

## 2025-04-28 NOTE — PROGRESS NOTES
Patient is stable status post C under the care of Dr. Bellamy.  Discussed results of procedure with patient and her family members.  Pictures provided.  Findings of the LHC revealed chronic 100% occlusion of the mid RCA with collaterals, mild disease of the left coronary system, patent previous stent in the circumflex artery and an LVEF of 40%.  Medical management is advised.  Please see procedural report for complete details.  Patient will be discharged to home in scheduled to follow-up with Dr. Bellamy in 2 weeks for further medical management.  Postprocedural activity, restrictions, potential complications, medications and future follow-up discussed at length.  All questions answered.  All verbalized an understanding.

## 2025-04-28 NOTE — POST-PROCEDURE NOTE
Physician Transition of Care Summary  Invasive Cardiovascular Lab    Procedure Date: 4/28/2025  Attending:    * Charles Bellamy - Primary  Resident/Fellow/Other Assistant: Surgeons and Role:  * No surgeons found with a matching role *    Indications:   Pre-op Diagnosis      * CAD S/P percutaneous coronary angioplasty [I25.10, Z98.61]     * Hypertension, unspecified type [I10]     * Chest pain, unspecified type [R07.9]     * Shortness of breath [R06.02]    Post-procedure diagnosis:   Post-op Diagnosis     * CAD S/P percutaneous coronary angioplasty [I25.10, Z98.61]     * Hypertension, unspecified type [I10]     * Chest pain, unspecified type [R07.9]     * Shortness of breath [R06.02]    Procedure(s):   [facility] WITH   [id number] - [place] CATH PLMT L HRT & ARTS W/NJX & ANGIO IMG S&I        Procedure Findings:   100% occlusion of the right coronary artery with collaterals, mild disease of left coronary system, patent circumflex stent, ejection fraction of 40%    Description of the Procedure:   Left heart catheterization coronary angiography left ventriculogram    Complications:   None    Stents/Implants:   Implants       No implant documentation for this case.            Anticoagulation/Antiplatelet Plan:   Intravenous heparin    Estimated Blood Loss:   0 mL    Anesthesia: Moderate Sedation Anesthesia Staff: No anesthesia staff entered.    Any Specimen(s) Removed:   Order Name Source Comment Collection Info Order Time   BASIC METABOLIC PANEL Blood, Venous  Collected By: Isidra Russell RN 4/28/2025 10:26 AM     Release result to MyChart   Immediate        CBC Blood, Venous  Collected By: Isidra Russell RN 4/28/2025 10:26 AM     Release result to MyChart   Immediate        EXTRA TUBES Blood, Venous   4/28/2025 10:36 AM     Light blue   No Labels          Red top   No Labels          Green   No Labels          Lavender   No Labels          SST   No Labels          Hodges   No Labels          White   No Labels          Urine Tube   No Labels           VTM Pink Tube   No Labels          Syringe   No Labels          Sterile Cup   No Labels          Wet Prep Tube   No Labels          Aptima Tube   No Labels          Stool   No Labels          Urine Hodges Tube   No Labels          PST   No Labels          PST Microtainer   No Labels          SST Microtainer   No Labels          RED Microtainer   No Labels          LAV Microtainer   No Labels            Disposition:   Medical therapy      Electronically signed by: Charles Bellamy MD, 4/28/2025 2:02 PM

## 2025-04-28 NOTE — DISCHARGE INSTRUCTIONS

## 2025-04-29 ENCOUNTER — PATIENT OUTREACH (OUTPATIENT)
Dept: CARDIOLOGY | Facility: CLINIC | Age: 75
End: 2025-04-29
Payer: MEDICARE

## 2025-04-29 NOTE — PROGRESS NOTES
Confirmation of at least 2 patient identifiers.    Completed telephonic follow-up with patient after recent visit with Dr Bellamy  4/15/25  Spoke to patient during outreach call.    Patient reports feeling: Improved    Patient has questions or concerns about medications:  no    Have all prescribed medications been filled? Yes    Patient has necessary resources to manage their care? Yes    Patient has questions or concerns? No    Next care management follow-up approximately within one month.  Care  information provided to patient.    Patient states her approach site for her HC looks good and she states she has no further questions

## 2025-05-05 ENCOUNTER — APPOINTMENT (OUTPATIENT)
Dept: CARDIOLOGY | Facility: CLINIC | Age: 75
End: 2025-05-05
Payer: MEDICARE

## 2025-05-13 ENCOUNTER — APPOINTMENT (OUTPATIENT)
Dept: CARDIOLOGY | Facility: CLINIC | Age: 75
End: 2025-05-13
Payer: MEDICARE

## 2025-05-13 VITALS
BODY MASS INDEX: 32.53 KG/M2 | DIASTOLIC BLOOD PRESSURE: 58 MMHG | SYSTOLIC BLOOD PRESSURE: 118 MMHG | HEIGHT: 63 IN | HEART RATE: 68 BPM | WEIGHT: 183.6 LBS

## 2025-05-13 DIAGNOSIS — I10 HYPERTENSION, UNSPECIFIED TYPE: ICD-10-CM

## 2025-05-13 DIAGNOSIS — I25.10 CAD S/P PERCUTANEOUS CORONARY ANGIOPLASTY: ICD-10-CM

## 2025-05-13 DIAGNOSIS — E11.9 TYPE 2 DIABETES MELLITUS WITHOUT COMPLICATION, WITHOUT LONG-TERM CURRENT USE OF INSULIN: ICD-10-CM

## 2025-05-13 DIAGNOSIS — E78.2 MIXED HYPERLIPIDEMIA: ICD-10-CM

## 2025-05-13 DIAGNOSIS — Z87.891 FORMER SMOKER: ICD-10-CM

## 2025-05-13 DIAGNOSIS — Z98.61 CAD S/P PERCUTANEOUS CORONARY ANGIOPLASTY: ICD-10-CM

## 2025-05-13 DIAGNOSIS — E66.811 OBESITY (BMI 30.0-34.9): ICD-10-CM

## 2025-05-13 PROCEDURE — 3078F DIAST BP <80 MM HG: CPT | Performed by: INTERNAL MEDICINE

## 2025-05-13 PROCEDURE — 99214 OFFICE O/P EST MOD 30 MIN: CPT | Performed by: INTERNAL MEDICINE

## 2025-05-13 PROCEDURE — 1159F MED LIST DOCD IN RCRD: CPT | Performed by: INTERNAL MEDICINE

## 2025-05-13 PROCEDURE — 1036F TOBACCO NON-USER: CPT | Performed by: INTERNAL MEDICINE

## 2025-05-13 PROCEDURE — 4010F ACE/ARB THERAPY RXD/TAKEN: CPT | Performed by: INTERNAL MEDICINE

## 2025-05-13 PROCEDURE — 3074F SYST BP LT 130 MM HG: CPT | Performed by: INTERNAL MEDICINE

## 2025-05-13 RX ORDER — ALBUTEROL SULFATE 90 UG/1
2 INHALANT RESPIRATORY (INHALATION) EVERY 4 HOURS PRN
COMMUNITY

## 2025-05-13 NOTE — PROGRESS NOTES
Referred by Dr. Asif ref. provider found provider found for   Chief Complaint   Patient presents with    Follow-up     2 week follow up after [facility]        Chief complaint:   Chief Complaint   Patient presents with    Follow-up     2 week follow up after [facility]        History of Present Illness  Lisa Andrade is a 74 y.o. year old female patient.  Cardiac cath showed mild disease with patent previous stents.  She had a sleep study done which was strongly positive.  Discussed with the patient at this point cardiac wise stable will call for any problem and follow-up as scheduled    Past Medical History  Medical History[1]    Social History  Social History[2]    Family History   Family History[3]    Review of Systems  As per HPI, all other systems reviewed and negative.    Allergies:  RX Allergies[4]     Outpatient Medications:  Current Outpatient Medications   Medication Instructions    albuterol 90 mcg/actuation inhaler 2 puffs, Every 4 hours PRN    amLODIPine (NORVASC) 5 mg, Daily    aspirin 81 mg, Daily    cetirizine (ZYRTEC) 10 mg, oral, Daily    fluticasone (Flonase) 50 mcg/actuation nasal spray 2 sprays, Each Nostril, Daily, Shake gently. Before first use, prime pump. After use, clean tip and replace cap.    fluticasone-umeclidin-vilanter (Trelegy Ellipta) 100-62.5-25 mcg blister with device 1 puff, Daily    glipiZIDE XL (Glucotrol XL) 2.5 mg 24 hr tablet 1 tablet, Daily    hydroCHLOROthiazide (HYDRODIURIL) 25 mg, Daily RT    HYDROcodone-acetaminophen (Norco) 5-325 mg tablet 1 tablet, Every 6 hours PRN    isosorbide mononitrate ER (Imdur) 30 mg 24 hr tablet 1 tablet, Daily    loratadine (CLARITIN) 5 mg, Daily    losartan (COZAAR) 100 mg, Daily    magnesium oxide (MAG-OX) 400 mg, 3 times daily    metFORMIN (GLUCOPHAGE) 500 mg, 2 times daily    metoprolol tartrate (LOPRESSOR) 50 mg, 2 times daily    nitroglycerin (NITROSTAT) 0.4 mg, Every 5 min PRN    omeprazole (PRILOSEC) 20 mg, Daily before breakfast    pravastatin  (PRAVACHOL) 80 mg, Daily    pravastatin (PRAVACHOL) 40 mg, Every other day    ranolazine (RANEXA) 500 mg, oral, 2 times daily, Do not crush, chew, or split.    spironolactone (ALDACTONE) 25 mg, Daily         Vitals:  Vitals:    05/13/25 1302   BP: 118/58   Pulse: 68       Physical Exam:  Physical Exam  Vitals and nursing note reviewed.   Constitutional:       Appearance: Normal appearance.   HENT:      Head: Normocephalic.   Eyes:      Pupils: Pupils are equal, round, and reactive to light.   Cardiovascular:      Rate and Rhythm: Normal rate and regular rhythm.      Pulses: Normal pulses.      Heart sounds: Normal heart sounds.   Pulmonary:      Effort: Pulmonary effort is normal.      Breath sounds: Normal breath sounds.   Musculoskeletal:         General: Normal range of motion.      Cervical back: Normal range of motion.   Skin:     General: Skin is dry.   Neurological:      General: No focal deficit present.      Mental Status: She is alert and oriented to person, place, and time.   Psychiatric:         Behavior: Behavior is cooperative.             Assessment/Plan   Diagnoses and all orders for this visit:  CAD S/P percutaneous coronary angioplasty  Hypertension, unspecified type  Mixed hyperlipidemia  Type 2 diabetes mellitus without complication, without long-term current use of insulin  Obesity (BMI 30.0-34.9)  Former smoker      ISherry LPN am scribing for, and in the presence of Charles Bellamy M.D..    Charles LOPEZ M.D., personally performed the services described in the documentation as scribed by Sherry Thapa LPN in my presence, and confirm it is both accurate and complete.      Charles Bellamy MD [facility]  Interventional Cardiology   of [facility]     Thank you for allowing me to participate in the care of this patient. Please do not hesitate to contact me with any further questions or concerns.         [1]   Past Medical History:  Diagnosis Date    Breast cancer     Breast  cyst 2012    Chemotherapy-induced peripheral neuropathy (Multi)     Emphysema of lung (Multi)     Failed back syndrome of cervical spine 2024    Fibromyalgia     Former smoker     GERD (gastroesophageal reflux disease)     Heart murmur     Hiatal hernia     History of cardiac murmur     Hx antineoplastic chemo     Hyperlipidemia     Hypertension     Hypokalemia     Iron deficiency anemia     Lung cancer (Multi)     Old myocardial infarction     Personal history of irradiation     Personal history of malignant neoplasm of breast     Rheumatoid arthritis     Sleep apnea     Type 2 diabetes mellitus without complication, without long-term current use of insulin    [2]   Social History  Tobacco Use    Smoking status: Former     Current packs/day: 0.00     Types: Cigarettes     Quit date: 2020     Years since quittin.4    Smokeless tobacco: Never   Vaping Use    Vaping status: Never Used   Substance Use Topics    Alcohol use: Not Currently    Drug use: Never   [3]   Family History  Problem Relation Name Age of Onset    Hypertension Mother Renata Mcmillan     Coronary artery disease Mother Renata Mcmillan     Migraines Mother Renata Mcmillan     No Known Problems Father      Hodgkin's lymphoma Sister      Uterine cancer Sister     [4]   Allergies  Allergen Reactions    Fish Oil Nausea/vomiting    Lipitor [Atorvastatin] Rash    Wasp Venom Unknown    Zocor [Simvastatin] Rash

## 2025-05-29 ENCOUNTER — PATIENT OUTREACH (OUTPATIENT)
Dept: CARDIOLOGY | Facility: CLINIC | Age: 75
End: 2025-05-29
Payer: MEDICARE

## 2025-05-29 NOTE — PROGRESS NOTES
Successful outreach to patient regarding hospitalization as patient continues TCM program.   At time of outreach call the patient feels as if their condition has improved since initial visit with PCP or specialist.  Questions or concerns addressed at this time with patient.   Provided contact information to patient if any further non-emergent needs arise.  Patient states she currently has the Flu

## 2025-06-30 ENCOUNTER — LAB (OUTPATIENT)
Dept: LAB | Facility: CLINIC | Age: 75
End: 2025-06-30
Payer: MEDICARE

## 2025-06-30 DIAGNOSIS — D50.0 IRON DEFICIENCY ANEMIA SECONDARY TO BLOOD LOSS (CHRONIC): Primary | ICD-10-CM

## 2025-06-30 LAB
BASOPHILS # BLD AUTO: 0.03 X10*3/UL (ref 0–0.1)
BASOPHILS NFR BLD AUTO: 0.6 %
EOSINOPHIL # BLD AUTO: 0.07 X10*3/UL (ref 0–0.4)
EOSINOPHIL NFR BLD AUTO: 1.5 %
ERYTHROCYTE [DISTWIDTH] IN BLOOD BY AUTOMATED COUNT: 18.1 % (ref 11.5–14.5)
HCT VFR BLD AUTO: 32 % (ref 36–46)
HGB BLD-MCNC: 9.7 G/DL (ref 12–16)
IMM GRANULOCYTES # BLD AUTO: 0.02 X10*3/UL (ref 0–0.5)
IMM GRANULOCYTES NFR BLD AUTO: 0.4 % (ref 0–0.9)
LYMPHOCYTES # BLD AUTO: 1.36 X10*3/UL (ref 0.8–3)
LYMPHOCYTES NFR BLD AUTO: 28.3 %
MCH RBC QN AUTO: 28.7 PG (ref 26–34)
MCHC RBC AUTO-ENTMCNC: 30.3 G/DL (ref 32–36)
MCV RBC AUTO: 95 FL (ref 80–100)
MONOCYTES # BLD AUTO: 0.54 X10*3/UL (ref 0.05–0.8)
MONOCYTES NFR BLD AUTO: 11.3 %
NEUTROPHILS # BLD AUTO: 2.78 X10*3/UL (ref 1.6–5.5)
NEUTROPHILS NFR BLD AUTO: 57.9 %
PLATELET # BLD AUTO: 231 X10*3/UL (ref 150–450)
RBC # BLD AUTO: 3.38 X10*6/UL (ref 4–5.2)
WBC # BLD AUTO: 4.8 X10*3/UL (ref 4.4–11.3)

## 2025-06-30 PROCEDURE — 36415 COLL VENOUS BLD VENIPUNCTURE: CPT

## 2025-06-30 PROCEDURE — 85025 COMPLETE CBC W/AUTO DIFF WBC: CPT

## 2025-07-01 ENCOUNTER — PATIENT OUTREACH (OUTPATIENT)
Dept: CARDIOLOGY | Facility: CLINIC | Age: 75
End: 2025-07-01
Payer: MEDICARE

## 2025-08-21 RX ORDER — ISOSORBIDE MONONITRATE 60 MG/1
60 TABLET, EXTENDED RELEASE ORAL DAILY
Qty: 90 TABLET | Refills: 1 | Status: CANCELLED | OUTPATIENT
Start: 2025-08-21

## 2025-08-29 DIAGNOSIS — R07.9 CHEST PAIN, UNSPECIFIED TYPE: ICD-10-CM

## 2025-08-29 RX ORDER — ISOSORBIDE MONONITRATE 60 MG/1
60 TABLET, EXTENDED RELEASE ORAL DAILY
Qty: 90 TABLET | Refills: 3 | Status: SHIPPED | OUTPATIENT
Start: 2025-08-29 | End: 2026-08-29

## 2026-02-16 ENCOUNTER — APPOINTMENT (OUTPATIENT)
Dept: CARDIOLOGY | Facility: CLINIC | Age: 76
End: 2026-02-16
Payer: MEDICARE

## (undated) DEVICE — CATHETER, OPTITORQUE, 5FR, JACKY, 3.5/ 2H/110CM, CURVED

## (undated) DEVICE — BAND, VASCULAR, RADIAL HEMOSTAT, REGULAR 24CM

## (undated) DEVICE — TUBING, MANIFOLD, LOW PRESSURE

## (undated) DEVICE — CATHETER, DIAGNOSTIC, AMPLATZ, 5 FR, AR MOD

## (undated) DEVICE — SHEATH, GLIDESHEATH, SLENDER, 6FR 10CM

## (undated) DEVICE — SYRINGE, ANGIOGRAPHIC, MULTIDOSE